# Patient Record
Sex: FEMALE | Race: WHITE | NOT HISPANIC OR LATINO | Employment: UNEMPLOYED | URBAN - METROPOLITAN AREA
[De-identification: names, ages, dates, MRNs, and addresses within clinical notes are randomized per-mention and may not be internally consistent; named-entity substitution may affect disease eponyms.]

---
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
## 2020-06-03 ENCOUNTER — TELEMEDICINE (OUTPATIENT)
Dept: MEDICAL GROUP | Facility: MEDICAL CENTER | Age: 20
End: 2020-06-03
Payer: COMMERCIAL

## 2020-06-03 VITALS — BODY MASS INDEX: 22.2 KG/M2 | HEIGHT: 64 IN | HEART RATE: 88 BPM | WEIGHT: 130 LBS

## 2020-06-03 DIAGNOSIS — F33.1 MAJOR DEPRESSIVE DISORDER, RECURRENT EPISODE, MODERATE (HCC): ICD-10-CM

## 2020-06-03 DIAGNOSIS — F33.41 RECURRENT MAJOR DEPRESSIVE DISORDER, IN PARTIAL REMISSION (HCC): ICD-10-CM

## 2020-06-03 DIAGNOSIS — J45.20 MILD INTERMITTENT ASTHMA WITHOUT COMPLICATION: ICD-10-CM

## 2020-06-03 DIAGNOSIS — Z30.431 ENCOUNTER FOR ROUTINE CHECKING OF INTRAUTERINE CONTRACEPTIVE DEVICE (IUD): ICD-10-CM

## 2020-06-03 PROCEDURE — 99442 PR PHYSICIAN TELEPHONE EVALUATION 11-20 MIN: CPT | Mod: CR | Performed by: FAMILY MEDICINE

## 2020-06-03 RX ORDER — SERTRALINE HYDROCHLORIDE 100 MG/1
100 TABLET, FILM COATED ORAL DAILY
Qty: 90 TAB | Refills: 3 | Status: SHIPPED | OUTPATIENT
Start: 2020-06-03 | End: 2021-06-02 | Stop reason: SDUPTHER

## 2020-06-03 SDOH — HEALTH STABILITY: MENTAL HEALTH: HOW OFTEN DO YOU HAVE A DRINK CONTAINING ALCOHOL?: 2-4 TIMES A MONTH

## 2020-06-03 NOTE — PROGRESS NOTES
Telephone Appointment Visit   As a means of avoiding spread of COVID-19, this visit is being conducted by telephone. This telephone visit was initiated by the patient and they verbally consented.  We had issues with video so converted to telephone appt per patient request   Time at start of call: 1:20    Reason for Call:  Medication Follow-up    HPI:    Refill zoloft      Labs / Images Reviewed:        Assessment and Plan:     1. Recurrent major depressive disorder, in partial remission (HCC)  - sertraline (ZOLOFT) 100 MG Tab; Take 1 Tab by mouth every day.  Dispense: 90 Tab; Refill: 3    2. Major depressive disorder, recurrent episode, moderate (HCC)    3. Encounter for routine checking of intrauterine contraceptive device (IUD)    4. Mild intermittent asthma without complication    Problem List Items Addressed This Visit     Major depressive disorder, recurrent episode, moderate (HCC)     Takes zoloft   Was initially seeing pediatric psychiatrist            Relevant Medications    sertraline (ZOLOFT) 100 MG Tab    Mild intermittent asthma without complication     Overall well controlled   She has been doing allergy shots for 5 years               Encounter for routine checking of intrauterine contraceptive device (IUD)     Just got IUD about 2 mo ago   Off ocp                Other Visit Diagnoses     Recurrent major depressive disorder, in partial remission (HCC)        Relevant Medications    sertraline (ZOLOFT) 100 MG Tab          Follow-up:     Time at end of call: 1:35  Total Time Spent: 11-20 minutes    Yajaira Gardner M.D.

## 2020-10-03 ENCOUNTER — HOSPITAL ENCOUNTER (EMERGENCY)
Facility: MEDICAL CENTER | Age: 20
End: 2020-10-04
Attending: EMERGENCY MEDICINE | Admitting: EMERGENCY MEDICINE
Payer: COMMERCIAL

## 2020-10-03 DIAGNOSIS — S09.90XA CLOSED HEAD INJURY, INITIAL ENCOUNTER: ICD-10-CM

## 2020-10-03 DIAGNOSIS — S01.01XA LACERATION OF SCALP, INITIAL ENCOUNTER: ICD-10-CM

## 2020-10-03 PROCEDURE — 304999 HCHG REPAIR-SIMPLE/INTERMED LEVEL 1

## 2020-10-03 PROCEDURE — 305308 HCHG STAPLER,SKIN,DISP.

## 2020-10-03 PROCEDURE — 304217 HCHG IRRIGATION SYSTEM

## 2020-10-03 PROCEDURE — 99281 EMR DPT VST MAYX REQ PHY/QHP: CPT

## 2020-10-04 VITALS
SYSTOLIC BLOOD PRESSURE: 115 MMHG | HEART RATE: 82 BPM | HEIGHT: 64 IN | RESPIRATION RATE: 16 BRPM | OXYGEN SATURATION: 98 % | DIASTOLIC BLOOD PRESSURE: 68 MMHG | WEIGHT: 130 LBS | BODY MASS INDEX: 22.2 KG/M2 | TEMPERATURE: 97.2 F

## 2020-10-04 NOTE — ED PROVIDER NOTES
"ED Provider Note    CHIEF COMPLAINT  Fall, scalp laceration    HPI  Ava Pearce is a 20 y.o. female who presents to the emergency department for evaluation of fall and scalp laceration.  The patient states that she was walking in her backyard when she excellently slipped and fell onto compacted dirt.  She states that she struck the right posterior side of her scalp.  She noted some bleeding from the area.  She denies any loss of consciousness.  She is not had any vomiting since incident.  She states that she is mildly lightheaded but denies any changes in vision, difficulty walking, difficulty speaking, or focal neuro deficit.  She denies any recent fevers, coughing, wheezing, congestion, runny nose, sore throat, chest pain, shortness of breath, abdominal pain, diarrhea, or urinary symptoms.  She is not better anyone with COVID-19 that she is aware of.    REVIEW OF SYSTEMS  See HPI for further details. All other systems are negative.     PAST MEDICAL HISTORY   has a past medical history of Allergy, Anxiety, ASTHMA, Depression, and Heart burn.    SOCIAL HISTORY  Social History     Tobacco Use   • Smoking status: Never Smoker   • Smokeless tobacco: Never Used   • Tobacco comment: Rarely \"vape\"   Substance and Sexual Activity   • Alcohol use: Yes     Frequency: 2-4 times a month     Comment: weekly   • Drug use: Yes     Types: Marijuana     Comment: daily   • Sexual activity: Yes     Birth control/protection: Pill       SURGICAL HISTORY   has a past surgical history that includes turbinoplasty (Bilateral, 7/26/2017); antrostomy (7/26/2017); and ethmoidectomy (7/26/2017).    CURRENT MEDICATIONS  Home Medications    **Home medications have not yet been reviewed for this encounter**         ALLERGIES  Allergies   Allergen Reactions   • Pcn [Penicillins] Rash and Swelling   • Wellbutrin [Bupropion]        PHYSICAL EXAM  VITAL SIGNS: /75   Pulse 80   Temp 36.2 °C (97.2 °F) (Temporal)   Resp 16   Ht 1.626 " "m (5' 4\")   Wt 59 kg (130 lb)   LMP 09/03/2020   SpO2 98%   BMI 22.31 kg/m²    Constitutional: Alert and in no apparent distress.  HENT: Normocephalic atraumatic.  There is a 1 cm linear parietal scalp laceration on the right.  No associated boggy hematoma or step-off deformities are noted.  No hemotympanum.  Bilateral external ears normal. Nose normal. Mucous membranes are moist.  Eyes: Pupils are equal and reactive. Conjunctiva normal. Non-icteric sclera.   Neck: Normal range of motion without tenderness. Supple. No meningeal signs.  Cardiovascular: Regular rate and rhythm. No murmurs, gallops or rubs.  Thorax & Lungs: Breath sounds are clear to auscultation bilaterally. No wheezing, rhonchi or rales.  Musculoskeletal: Good range of motion in all major joints. No tenderness to palpation or major deformities noted.   Neurologic: Alert and oriented ×3.  No facial asymmetry.  The patient moves all 4 extremities and follows commands.  Normal gait.    DIAGNOSTIC STUDIES / PROCEDURES    Laceration Repair Procedure    Indication: Laceration    Location/Description: 1 cm right parietal scalp    Procedure: The patient was placed in the appropriate position and anesthesia around the laceration was not performed at the patient's request. The area was then irrigated with normal saline. The laceration was closed with staples. There were no additional lacerations requiring repair. The wound area was then dressed with bacitracin.      Total repaired wound length: 1 cm.     Other Items: Staple count: 2    The patient tolerated the procedure well.    Complications: None    COURSE & MEDICAL DECISION MAKING  Pertinent Labs & Imaging studies reviewed. (See chart for details)    This is a 20-year-old female presenting to the emergency department for evaluation of a scalp laceration.  On initial evaluation, the patient.  Well and in no acute distress.  Her vital signs were completely normal.  Her neurologic exam was reassuring and " nonfocal.  She did have some mild tenderness palpation over the right parietal scalp and a laceration of approximately 1 cm in length was noted.  No associated boggy hematoma or step-off deformities were noted.  No hemotympanum or healy sign were noted.  At this time, I am less concern for significant injury including skull fracture or intracranial hemorrhage given the lack of vomiting, lack of loss of consciousness and reassuring neurologic exam.  I do not think that she requires a head CT at this time.  The wound was repaired.  Please see note above.  She is stable for discharge and encouraged her to follow-up in 7 days for staple removal.  She will return to the ED with any worsening signs or symptoms.    Patient with acute low mechanism head injury with completely intact neurovascular exam, and pain improved in ED. CT head was not indicated today, and patient is managed empirically as a mild head injury, given instruction on follow up and signs/symptoms to return to ED. Patient expressed understanding and is agreeable. Patient is improved and discharged home in no distress.    I verified that the patient was wearing a mask and I was wearing appropriate PPE every time I entered the room. The patient's mask was on the patient at all times during my encounter except for a brief view of the oropharynx.    FINAL IMPRESSION  1. Laceration of scalp, initial encounter    2. Closed head injury, initial encounter        PRESCRIPTIONS  New Prescriptions    No medications on file       FOLLOW UP  Yajaira Gardner M.D.  [address] [facility]  [address] [place] [id number]  [phone]    Go in 7 days  For suture removal    [facility], Emergency Dept  [address] [facility] [id number]  [phone]  Go to   As needed        -DISCHARGE-      Electronically signed by: Mel Lea D.O., 10/3/2020 11:22 PM

## 2020-10-04 NOTE — ED TRIAGE NOTES
Walked into triage c/o head lac to posterior part of the head.  Pt states she tripped and fell backwards.  Bleeding controlled at this time, denies LOC or n/v    Pt & staff masked and in appropriate PPE during encounter.  Pt denies fever/travel or being in contact with anyone testing positive for Covid.  Explained pt the triage process, made pt aware to tell the RN/staff of any changes/concerns, pt verbalized understanding of process and instructions given.  Pt to ROLANDO neal.

## 2020-10-09 ENCOUNTER — OFFICE VISIT (OUTPATIENT)
Dept: MEDICAL GROUP | Facility: MEDICAL CENTER | Age: 20
End: 2020-10-09
Payer: COMMERCIAL

## 2020-10-09 VITALS
BODY MASS INDEX: 22.88 KG/M2 | HEART RATE: 61 BPM | OXYGEN SATURATION: 98 % | SYSTOLIC BLOOD PRESSURE: 102 MMHG | DIASTOLIC BLOOD PRESSURE: 64 MMHG | TEMPERATURE: 97.6 F | WEIGHT: 134 LBS | HEIGHT: 64 IN

## 2020-10-09 DIAGNOSIS — S01.01XD LACERATION OF SCALP, SUBSEQUENT ENCOUNTER: ICD-10-CM

## 2020-10-09 PROBLEM — S01.01XA SCALP LACERATION: Status: ACTIVE | Noted: 2020-10-09

## 2020-10-09 PROCEDURE — 99213 OFFICE O/P EST LOW 20 MIN: CPT | Performed by: FAMILY MEDICINE

## 2020-10-09 ASSESSMENT — PATIENT HEALTH QUESTIONNAIRE - PHQ9
SUM OF ALL RESPONSES TO PHQ QUESTIONS 1-9: 0
2. FEELING DOWN, DEPRESSED, IRRITABLE, OR HOPELESS: NOT AT ALL
3. TROUBLE FALLING OR STAYING ASLEEP OR SLEEPING TOO MUCH: NOT AT ALL
1. LITTLE INTEREST OR PLEASURE IN DOING THINGS: NOT AT ALL
9. THOUGHTS THAT YOU WOULD BE BETTER OFF DEAD, OR OF HURTING YOURSELF: NOT AT ALL
SUM OF ALL RESPONSES TO PHQ9 QUESTIONS 1 AND 2: 0
5. POOR APPETITE OR OVEREATING: NOT AT ALL
4. FEELING TIRED OR HAVING LITTLE ENERGY: NOT AT ALL
8. MOVING OR SPEAKING SO SLOWLY THAT OTHER PEOPLE COULD HAVE NOTICED. OR THE OPPOSITE, BEING SO FIGETY OR RESTLESS THAT YOU HAVE BEEN MOVING AROUND A LOT MORE THAN USUAL: NOT AT ALL
7. TROUBLE CONCENTRATING ON THINGS, SUCH AS READING THE NEWSPAPER OR WATCHING TELEVISION: NOT AT ALL
6. FEELING BAD ABOUT YOURSELF - OR THAT YOU ARE A FAILURE OR HAVE LET YOURSELF OR YOUR FAMILY DOWN: NOT AL ALL

## 2020-10-09 NOTE — ASSESSMENT & PLAN NOTE
The patient suffered a scalp laceration on 10/3/20 after falling on compacted dirt. She was seen in the ED following her injury.  Her laceration was irrigated and closed with 2 staples.  Her symptoms did not warrant imaging. The patient denies headache, nausea, vomiting, confusion, or vision changes. Scabbed area with two staples on right parietal area visualized, stable removed without difficulty today.

## 2020-10-09 NOTE — PROGRESS NOTES
"Subjective:     CC: staple removal    HPI:   Ava presents today with:    Scalp laceration  The patient suffered a scalp laceration on 10/3/20 after falling on compacted dirt. She was seen in the ED following her injury.  Her laceration was irrigated and closed with 2 staples.  Her symptoms did not warrant imaging. The patient denies headache, nausea, vomiting, confusion, or vision changes. Scabbed area with two staples on right parietal area visualized, stable removed without difficulty today.      Past Medical History:   Diagnosis Date   • Allergy    • Anxiety    • ASTHMA    • Depression    • Heart burn        Social History     Tobacco Use   • Smoking status: Never Smoker   • Smokeless tobacco: Never Used   • Tobacco comment: Rarely \"vape\"   Substance Use Topics   • Alcohol use: Yes     Frequency: 2-4 times a month     Comment: weekly   • Drug use: Yes     Types: Marijuana     Comment: daily       Current Outpatient Medications Ordered in Epic   Medication Sig Dispense Refill   • sertraline (ZOLOFT) 100 MG Tab Take 1 Tab by mouth every day. 90 Tab 3   • fexofenadine-pseudoephedrine (ALLEGRA-D)  MG per tablet Take 1 Tab by mouth 2 times a day. 30 Tab 0   • Olopatadine HCl 0.6 % Solution      • fluticasone (FLONASE) 50 MCG/ACT nasal spray Spray 1 Spray in nose every day.     • albuterol (VENTOLIN OR PROVENTIL) 108 (90 BASE) MCG/ACT AERS Inhale 2 Puffs by mouth every 6 hours as needed.       • azithromycin (ZITHROMAX) 250 MG Tab Take 2 tabs on day 1 then 1 on day 2-5 (Patient not taking: Reported on 10/9/2020) 6 Tab 0     No current Epic-ordered facility-administered medications on file.        Allergies:  Pcn [penicillins] and Wellbutrin [bupropion]    Health Maintenance: Patient declined vaccinations    ROS:  Gen: no fevers/chills, no changes in weight  Eyes: no changes in vision  ENT: no sore throat, no hearing loss, no bloody nose  Pulm: no SOB  CV: no chest pain      Objective:       Exam:  /64 (BP " "Location: Right arm, Patient Position: Sitting, BP Cuff Size: Adult long)   Pulse 61   Temp 36.4 °C (97.6 °F) (Temporal)   Ht 1.626 m (5' 4\")   Wt 60.8 kg (134 lb)   LMP 10/07/2020 (Approximate)   SpO2 98%   BMI 23.00 kg/m²  Body mass index is 23 kg/m².    Constitutional: Alert, no distress, well-groomed  Skin: Scabbed area with two staples on right parietal area visualized  Eyes: Equal, round and reactive, conjunctiva clear, no ptosis  ENMT: Lips without lesions, good dentition, moist mucous membranes  Neck: Trachea midline, no obvious thyromegaly  Respiratory: Unlabored respiratory effort  Neuro: Grossly non-focal  Psych: Alert and oriented, normal affect and mood        Assessment & Plan:     20 y.o. female with the following -     1. Laceration of scalp, subsequent encounter  Patient suffered 1cm scalp laceration 10/3/20 after GLF, two staples placed in ED. Staples removed without difficulty today. Instructed to wash scalp gently with shampoo as she has been. S/sx of infection and return precautions discussed.    Return if symptoms worsen or fail to improve.    Please note this dictation was created using voice recognition software. I have made every reasonable attempt to correct obvious errors, but I expect there may be errors of grammar, and possibly content, that I did not discover before finalizing the note.       "

## 2021-01-14 ENCOUNTER — OFFICE VISIT (OUTPATIENT)
Dept: MEDICAL GROUP | Facility: MEDICAL CENTER | Age: 21
End: 2021-01-14
Payer: COMMERCIAL

## 2021-01-14 VITALS
HEART RATE: 60 BPM | OXYGEN SATURATION: 96 % | TEMPERATURE: 97.6 F | RESPIRATION RATE: 16 BRPM | HEIGHT: 64 IN | WEIGHT: 138.89 LBS | SYSTOLIC BLOOD PRESSURE: 98 MMHG | BODY MASS INDEX: 23.71 KG/M2 | DIASTOLIC BLOOD PRESSURE: 58 MMHG

## 2021-01-14 DIAGNOSIS — R53.83 OTHER FATIGUE: ICD-10-CM

## 2021-01-14 DIAGNOSIS — Z30.9 ENCOUNTER FOR CONTRACEPTIVE MANAGEMENT, UNSPECIFIED TYPE: ICD-10-CM

## 2021-01-14 DIAGNOSIS — R10.2 PELVIC PAIN IN FEMALE: ICD-10-CM

## 2021-01-14 PROCEDURE — 99214 OFFICE O/P EST MOD 30 MIN: CPT | Mod: 95,CR | Performed by: FAMILY MEDICINE

## 2021-01-14 RX ORDER — NORETHINDRONE ACETATE AND ETHINYL ESTRADIOL .02; 1 MG/1; MG/1
1 TABLET ORAL DAILY
Qty: 84 TAB | Refills: 3 | Status: SHIPPED
Start: 2021-01-14 | End: 2021-02-10

## 2021-01-14 NOTE — ASSESSMENT & PLAN NOTE
Had 2-3 days of crampy intermittent pelvic pain last week which has resolved  She is sexually active   She has stopped her nuvaring   lmp 12/26    I am open to labs per her request for her chronic fatigue but also suggest that we include u preg and sti screen, repeat u preg 2 weeks after last episode of unprotected sexual intercourse     Option to do pelvic US if pelvic pain worsens /returns     We discuss long term contraception options and ultimately decide on ocp rx sent in today risk benefit side effect discussed counseled to use back up method for 2 weeks since she has had gap in her contraception     Virtual follow up with me 2-3 weeks       Over 25 minutes spent with patient face to face, greater than 50% time spent with plan/coordination of care regarding that which is discussed in the HPI and A&P

## 2021-01-14 NOTE — PROGRESS NOTES
"Virtual Visit: Established Patient   This visit was conducted via Zoom using secure and encrypted videoconferencing technology. The patient was in a private location in the state of [place].    The patient's identity was confirmed and verbal consent was obtained for this virtual visit.    Subjective:   CC:   Chief Complaint   Patient presents with   • GI Problem     stomach pain starting starting about 1 weeks ago   • Requesting Labs     interested in blood work for anemia, has had fatigue recently       Ava Pearce is a 20 y.o. female presenting for evaluation and management of:    Lower stomach pelvic pain bloating cramping   No abnormal vaginal discharge   No fever   No n/v   No change in stool   Lasted 2-3 days and occurred \"last week\"  She is feeling well currently     She is here today with her mom     ROS  Denies any recent fevers or chills. No nausea or vomiting. No chest pains or shortness of breath.     Allergies   Allergen Reactions   • Pcn [Penicillins] Rash and Swelling   • Wellbutrin [Bupropion]        Current medicines (including changes today)  Current Outpatient Medications   Medication Sig Dispense Refill   • norethindrone-ethinyl estradiol (EVE 1/20) 1-20 MG-MCG per tablet Take 1 Tab by mouth every day. 84 Tab 3   • sertraline (ZOLOFT) 100 MG Tab Take 1 Tab by mouth every day. 90 Tab 3   • fexofenadine-pseudoephedrine (ALLEGRA-D)  MG per tablet Take 1 Tab by mouth 2 times a day. 30 Tab 0   • Olopatadine HCl 0.6 % Solution      • fluticasone (FLONASE) 50 MCG/ACT nasal spray Spray 1 Spray in nose every day.     • albuterol (VENTOLIN OR PROVENTIL) 108 (90 BASE) MCG/ACT AERS Inhale 2 Puffs by mouth every 6 hours as needed.       • azithromycin (ZITHROMAX) 250 MG Tab Take 2 tabs on day 1 then 1 on day 2-5 (Patient not taking: Reported on 10/9/2020) 6 Tab 0     No current facility-administered medications for this visit.        Patient Active Problem List    Diagnosis Date Noted   • " "Other fatigue 01/14/2021   • Pelvic pain in female 01/14/2021   • Scalp laceration 10/09/2020   • Encounter for routine checking of intrauterine contraceptive device (IUD) 06/03/2020   • Sore throat 11/18/2019   • Sinusitis 11/18/2019   • Epigastric pain 10/30/2019   • Bumps on skin 12/26/2018   • Dysuria 12/26/2018   • Self-mutilation 10/04/2018   • Encounter for counseling regarding contraception 08/20/2018   • Mild intermittent asthma without complication 08/20/2018   • Cannabis abuse, daily use 08/01/2018   • Suicidal ideation 08/01/2018   • Seasonal allergic rhinitis due to pollen 07/26/2017   • Recreational drug use, episodic 07/19/2017   • Attention deficit hyperactivity disorder, inattentive type 01/24/2017   • Major depressive disorder, recurrent episode, moderate (HCC) 10/27/2016       Family History   Problem Relation Age of Onset   • Depression Mother    • Depression Brother    • Drug abuse Brother    • Bipolar disorder Paternal Aunt    • Alcohol abuse Maternal Grandfather    • Depression Maternal Grandmother    • Depression Cousin        She  has a past medical history of Allergy, Anxiety, ASTHMA, Depression, and Heart burn.  She  has a past surgical history that includes turbinoplasty (Bilateral, 7/26/2017); antrostomy (7/26/2017); and ethmoidectomy (7/26/2017).       Objective:   BP (!) 98/58 (BP Location: Left arm, Patient Position: Sitting, BP Cuff Size: Adult)   Pulse 60   Temp 36.4 °C (97.6 °F) (Temporal)   Resp 16   Ht 1.626 m (5' 4\")   Wt 63 kg (138 lb 14.2 oz)   LMP 12/24/2020   SpO2 96%   BMI 23.84 kg/m²     Physical Exam  Constitutional: Alert, no distress, well-groomed.  Skin: No rashes in visible areas.  Eye: Round. Conjunctiva clear, lids normal. No icterus.   ENMT: Lips pink without lesions, good dentition, moist mucous membranes. Phonation normal.  Neck: No masses, no thyromegaly. Moves freely without pain.  Respiratory: Unlabored respiratory effort, no cough or audible " wheeze  Psych: Alert and oriented x3, normal affect and mood.       Assessment and Plan:   The following treatment plan was discussed:     1. Other fatigue    2. Pelvic pain in female  - HEMOGLOBIN AND HEMATOCRIT; Future  - TSH WITH REFLEX TO FT4; Future  - IRON/TOTAL IRON BIND; Future  - Comp Metabolic Panel; Future  - HCG QUALITATIVE UR; Future  - Chlamydia/GC PCR Urine Or Swab; Future  - US-PELVIC COMPLETE (TRANSABDOMINAL/TRANSVAGINAL) (COMBO); Future    3. Encounter for contraceptive management, unspecified type  - norethindrone-ethinyl estradiol (EVE 1/20) 1-20 MG-MCG per tablet; Take 1 Tab by mouth every day.  Dispense: 84 Tab; Refill: 3    Problem List Items Addressed This Visit     Other fatigue    Pelvic pain in female     Had 2-3 days of crampy intermittent pelvic pain last week which has resolved  She is sexually active   She has stopped her nuvaring   lmp 12/26    I am open to labs per her request for her chronic fatigue but also suggest that we include u preg and sti screen, repeat u preg 2 weeks after last episode of unprotected sexual intercourse     Option to do pelvic US if pelvic pain worsens /returns     We discuss long term contraception options and ultimately decide on ocp rx sent in today risk benefit side effect discussed counseled to use back up method for 2 weeks since she has had gap in her contraception     Virtual follow up with me 2-3 weeks       Over 25 minutes spent with patient face to face, greater than 50% time spent with plan/coordination of care regarding that which is discussed in the HPI and A&P             Relevant Orders    HEMOGLOBIN AND HEMATOCRIT    TSH WITH REFLEX TO FT4    IRON/TOTAL IRON BIND    Comp Metabolic Panel    HCG QUALITATIVE UR    Chlamydia/GC PCR Urine Or Swab    US-PELVIC COMPLETE (TRANSABDOMINAL/TRANSVAGINAL) (COMBO)      Other Visit Diagnoses     Encounter for contraceptive management, unspecified type        Relevant Medications    norethindrone-ethinyl  estradiol (EVE 1/20) 1-20 MG-MCG per tablet            Follow-up: No follow-ups on file.

## 2021-01-26 ENCOUNTER — HOSPITAL ENCOUNTER (OUTPATIENT)
Dept: LAB | Facility: MEDICAL CENTER | Age: 21
End: 2021-01-26
Attending: FAMILY MEDICINE
Payer: COMMERCIAL

## 2021-01-26 DIAGNOSIS — R10.2 PELVIC PAIN IN FEMALE: ICD-10-CM

## 2021-01-26 LAB
ALBUMIN SERPL BCP-MCNC: 4.5 G/DL (ref 3.2–4.9)
ALBUMIN/GLOB SERPL: 1.6 G/DL
ALP SERPL-CCNC: 79 U/L (ref 30–99)
ALT SERPL-CCNC: 16 U/L (ref 2–50)
ANION GAP SERPL CALC-SCNC: 10 MMOL/L (ref 7–16)
AST SERPL-CCNC: 13 U/L (ref 12–45)
BILIRUB SERPL-MCNC: 0.2 MG/DL (ref 0.1–1.5)
BUN SERPL-MCNC: 11 MG/DL (ref 8–22)
CALCIUM SERPL-MCNC: 9.7 MG/DL (ref 8.5–10.5)
CHLORIDE SERPL-SCNC: 102 MMOL/L (ref 96–112)
CO2 SERPL-SCNC: 23 MMOL/L (ref 20–33)
CREAT SERPL-MCNC: 0.65 MG/DL (ref 0.5–1.4)
GLOBULIN SER CALC-MCNC: 2.8 G/DL (ref 1.9–3.5)
GLUCOSE SERPL-MCNC: 75 MG/DL (ref 65–99)
HCT VFR BLD AUTO: 41.8 % (ref 37–47)
HGB BLD-MCNC: 13.4 G/DL (ref 12–16)
IRON SATN MFR SERPL: 17 % (ref 15–55)
IRON SERPL-MCNC: 69 UG/DL (ref 40–170)
POTASSIUM SERPL-SCNC: 3.9 MMOL/L (ref 3.6–5.5)
PROT SERPL-MCNC: 7.3 G/DL (ref 6–8.2)
SODIUM SERPL-SCNC: 135 MMOL/L (ref 135–145)
TIBC SERPL-MCNC: 400 UG/DL (ref 250–450)
TSH SERPL DL<=0.005 MIU/L-ACNC: 1.01 UIU/ML (ref 0.38–5.33)
UIBC SERPL-MCNC: 331 UG/DL (ref 110–370)

## 2021-01-26 PROCEDURE — 84443 ASSAY THYROID STIM HORMONE: CPT

## 2021-01-26 PROCEDURE — 81025 URINE PREGNANCY TEST: CPT

## 2021-01-26 PROCEDURE — 85014 HEMATOCRIT: CPT

## 2021-01-26 PROCEDURE — 83550 IRON BINDING TEST: CPT

## 2021-01-26 PROCEDURE — 80053 COMPREHEN METABOLIC PANEL: CPT

## 2021-01-26 PROCEDURE — 83540 ASSAY OF IRON: CPT

## 2021-01-26 PROCEDURE — 87491 CHLMYD TRACH DNA AMP PROBE: CPT

## 2021-01-26 PROCEDURE — 85018 HEMOGLOBIN: CPT

## 2021-01-26 PROCEDURE — 87591 N.GONORRHOEAE DNA AMP PROB: CPT

## 2021-01-26 PROCEDURE — 36415 COLL VENOUS BLD VENIPUNCTURE: CPT

## 2021-01-27 LAB
C TRACH DNA SPEC QL NAA+PROBE: NEGATIVE
HCG UR QL: NEGATIVE
N GONORRHOEA DNA SPEC QL NAA+PROBE: NEGATIVE
SPECIMEN SOURCE: NORMAL

## 2021-02-10 ENCOUNTER — OFFICE VISIT (OUTPATIENT)
Dept: MEDICAL GROUP | Facility: MEDICAL CENTER | Age: 21
End: 2021-02-10
Payer: COMMERCIAL

## 2021-02-10 VITALS
WEIGHT: 144.84 LBS | TEMPERATURE: 98.5 F | HEART RATE: 68 BPM | SYSTOLIC BLOOD PRESSURE: 122 MMHG | BODY MASS INDEX: 24.73 KG/M2 | OXYGEN SATURATION: 95 % | DIASTOLIC BLOOD PRESSURE: 80 MMHG | HEIGHT: 64 IN

## 2021-02-10 DIAGNOSIS — M26.629 TMJ PAIN DYSFUNCTION SYNDROME: ICD-10-CM

## 2021-02-10 DIAGNOSIS — R42 LIGHTHEADEDNESS: ICD-10-CM

## 2021-02-10 DIAGNOSIS — G43.019 INTRACTABLE MIGRAINE WITHOUT AURA AND WITHOUT STATUS MIGRAINOSUS: ICD-10-CM

## 2021-02-10 PROBLEM — S03.00XA TMJ (DISLOCATION OF TEMPOROMANDIBULAR JOINT): Status: ACTIVE | Noted: 2021-02-10

## 2021-02-10 PROCEDURE — 99214 OFFICE O/P EST MOD 30 MIN: CPT | Performed by: FAMILY MEDICINE

## 2021-02-10 RX ORDER — SUMATRIPTAN 25 MG/1
25-100 TABLET, FILM COATED ORAL
Qty: 10 TABLET | Refills: 3 | Status: SHIPPED | OUTPATIENT
Start: 2021-02-10

## 2021-02-10 NOTE — ASSESSMENT & PLAN NOTE
The patient reports history of migraines which have steadily been increasing in frequency over the past 5 months. She suffered ground-level fall in October 2020 during which she hit her head and reports increasing right TMJ pain and migraines since that time. She awakes at least four mornings with severe pain and tightness of right TMJ area and masseter.  She reports this leads to a migraine.  She has a history of TMJ disorder and has followed with [facility]. She wears a mouth guard at night.

## 2021-02-10 NOTE — PROGRESS NOTES
"Subjective:     CC: lightheadedness, migraines, TMJ pain    HPI:   Ava is established with Dr. Gardner and presents to discuss the following issues:    Lightheadedness  The patient reports intermittent lightheadedness when standing from a seated position. She reports this started about two months ago. She reports occasional associated nausea. No chest pain, palpitations, or shortness of breath.  The patient admits that she could increase her hydration.    Intractable migraine without aura and without status migrainosus  The patient reports history of migraines which have steadily been increasing in frequency over the past 5 months. She suffered ground-level fall in October 2020 during which she hit her head and reports increasing right TMJ pain and migraines since that time. She is concerned she may have had a brain injury; she is reassured s/p normal neuro exam. She awakes at least four mornings with severe pain and tightness of right TMJ area and masseter.  She reports this leads to a migraine.  She has a history of TMJ disorder and has followed with Dr[facility] Anish Rea [facility]. She wears a mouth guard at night.    The patient reports her migraines improve with resting in a dark room.    Past Medical History:   Diagnosis Date   • Allergy    • Anxiety    • ASTHMA    • Depression    • Heart burn        Social History     Tobacco Use   • Smoking status: Never Smoker   • Smokeless tobacco: Never Used   • Tobacco comment: Rarely \"vape\"   Substance Use Topics   • Alcohol use: Yes     Comment: weekly   • Drug use: Yes     Types: Marijuana     Comment: daily       Current Outpatient Medications Ordered in Epic   Medication Sig Dispense Refill   • SUMAtriptan (IMITREX) 25 MG Tab tablet Take 1-4 Tablets by mouth one time as needed for Migraine for up to 1 dose. 10 tablet 3   • sertraline (ZOLOFT) 100 MG Tab Take 1 Tab by mouth every day. 90 Tab 3   • Olopatadine HCl 0.6 % Solution      • fluticasone " "(FLONASE) 50 MCG/ACT nasal spray Spray 1 Spray in nose every day.     • albuterol (VENTOLIN OR PROVENTIL) 108 (90 BASE) MCG/ACT AERS Inhale 2 Puffs by mouth every 6 hours as needed.       • fexofenadine-pseudoephedrine (ALLEGRA-D)  MG per tablet Take 1 Tab by mouth 2 times a day. 30 Tab 0     No current Epic-ordered facility-administered medications on file.       Allergies:  Pcn [penicillins] and Wellbutrin [bupropion]    Health Maintenance:     ROS:  Gen: no fevers/chills, no changes in weight  Eyes: no changes in vision  ENT: no sore throat, no hearing loss, no bloody nose  Pulm: no SOB  CV: no chest pain        Objective:       Exam:  /80 (BP Location: Left arm, Patient Position: Sitting, BP Cuff Size: Adult)   Pulse 68   Temp 36.9 °C (98.5 °F) (Temporal)   Ht 1.626 m (5' 4\")   Wt 65.7 kg (144 lb 13.5 oz)   LMP 01/26/2021   SpO2 95%   BMI 24.86 kg/m²  Body mass index is 24.86 kg/m².    Gen: Alert and oriented, No apparent distress  Lungs: Normal effort, CTA bilaterally, no wheezes, rhonchi, or rales  CV: Regular rate and rhythm, no murmurs, rubs, or gallops  Neuro: CN II-XII intact, strength 5/5 and symmetric in biceps, triceps, quadricepts, hamstrings; 5/5  strength bilaterally; sensation intact bilaterally to light touch and pinprick, patellar reflexes 2+ bilaterally, no pronator drift, no dysdiadochokinesia,       Assessment & Plan:     20 y.o. female with the following -     1. Lightheadedness  I suspect this is secondary to dehydration vs vasovagal.  I do not feel we need to proceed with work-up for cardiac etiology at this time.   - Discussed increasing hydration at length with patient; RTC if symptoms persist    2. Intractable migraine without aura and without status migrainosus  3. TMJ pain dysfunction syndrome  The patient reports increasing migraine frequency which appears to be directly related to her TMJ pain. Patient will follow up with Dr. Anish Rea, TMJ specialist who she " has seen previously. In the meantime we discussed using sumatriptan for abortive therapy no more than twice weekly.    Other orders  - SUMAtriptan (IMITREX) 25 MG Tab tablet; Take 1-4 Tablets by mouth one time as needed for Migraine for up to 1 dose.  Dispense: 10 tablet; Refill: 3      Return if symptoms worsen or fail to improve.    Please note this dictation was created using voice recognition software. I have made every reasonable attempt to correct obvious errors, but I expect there may be errors of grammar, and possibly content, that I did not discover before finalizing the note.

## 2021-02-10 NOTE — ASSESSMENT & PLAN NOTE
The patient reports intermittent lightheadedness when standing from a seated position. She reports this started about two months ago. She reports occasional associated nausea. No chest pain, palpitations, or shortness of breath.  The patient admits that she could increase her hydration.

## 2021-03-12 ENCOUNTER — APPOINTMENT (RX ONLY)
Dept: URBAN - METROPOLITAN AREA CLINIC 4 | Facility: CLINIC | Age: 21
Setting detail: DERMATOLOGY
End: 2021-03-12

## 2021-03-12 DIAGNOSIS — L81.4 OTHER MELANIN HYPERPIGMENTATION: ICD-10-CM

## 2021-03-12 DIAGNOSIS — D22 MELANOCYTIC NEVI: ICD-10-CM

## 2021-03-12 PROBLEM — D22.39 MELANOCYTIC NEVI OF OTHER PARTS OF FACE: Status: ACTIVE | Noted: 2021-03-12

## 2021-03-12 PROCEDURE — ? ADDITIONAL NOTES

## 2021-03-12 PROCEDURE — ? COUNSELING

## 2021-03-12 PROCEDURE — 99212 OFFICE O/P EST SF 10 MIN: CPT

## 2021-03-12 ASSESSMENT — LOCATION ZONE DERM
LOCATION ZONE: NOSE
LOCATION ZONE: FACE

## 2021-03-12 ASSESSMENT — LOCATION SIMPLE DESCRIPTION DERM
LOCATION SIMPLE: GLABELLA
LOCATION SIMPLE: LEFT NOSE

## 2021-03-12 ASSESSMENT — LOCATION DETAILED DESCRIPTION DERM
LOCATION DETAILED: GLABELLA
LOCATION DETAILED: LEFT NASAL SIDEWALL

## 2021-03-12 NOTE — PROCEDURE: ADDITIONAL NOTES
Render Risk Assessment In Note?: no
Detail Level: Simple
Additional Notes: Recommend patient contact our cosmetic department for possible laser treatments.
Additional Notes: Recommend patient see plastic surgeon for removal.

## 2021-03-26 ENCOUNTER — APPOINTMENT (RX ONLY)
Dept: URBAN - METROPOLITAN AREA CLINIC 20 | Facility: CLINIC | Age: 21
Setting detail: DERMATOLOGY
End: 2021-03-26

## 2021-03-26 DIAGNOSIS — Z41.9 ENCOUNTER FOR PROCEDURE FOR PURPOSES OTHER THAN REMEDYING HEALTH STATE, UNSPECIFIED: ICD-10-CM

## 2021-03-26 PROCEDURE — ? COSMETIC CONSULTATION: BBL

## 2021-03-26 PROCEDURE — ? SCITON BBL

## 2021-03-26 ASSESSMENT — LOCATION SIMPLE DESCRIPTION DERM: LOCATION SIMPLE: LEFT CHEEK

## 2021-03-26 ASSESSMENT — LOCATION ZONE DERM: LOCATION ZONE: FACE

## 2021-03-26 ASSESSMENT — LOCATION DETAILED DESCRIPTION DERM: LOCATION DETAILED: LEFT SUPERIOR NASAL CHEEK

## 2021-03-26 NOTE — PROCEDURE: SCITON BBL
Passes: 1
Pulse Duration Units: milliseconds
Post-Care Instructions: I reviewed with the patient in detail post-care instructions. Patient should stay away from the sun and wear sun protection until treated areas are fully healed.
Pulse Duration: 20
Preprocedure Text: The treatment areas were thoroughly cleaned. Any exposed at risk hair that was not to be treated was covered in white paper tape. Clear ultrasound gel was applied to the treatment area. The area was treated with no immediate stacking of pulses.
Anesthesia Volume In Cc: 0
Spot Size: Finesse Adapter Size: 15 x 15 mm square
Fluence (J/Cm2): 25
Hide Repetition Rate?: No
Cooling ?: Yes
Post Procedure Text: The patient tolerated the procedure well. Ice-chilled washclothes were applied to the treatment area for comfort. Post care was reviewed with the patient.
Cooling (In C): 15
Repetition Rate (Hz): 10
Detail Level: Zone
Spot Size: Finesse Adapter Size: 7 mm round
Price (Use Numbers Only, No Special Characters Or $): 75
Consent: Written consent obtained, risks reviewed including but not limited to crusting, scabbing, blistering, scarring, darker or lighter pigmentary change, bruising, and/or incomplete response.

## 2021-05-18 ENCOUNTER — HOSPITAL ENCOUNTER (OUTPATIENT)
Facility: MEDICAL CENTER | Age: 21
End: 2021-05-18
Attending: NURSE PRACTITIONER
Payer: COMMERCIAL

## 2021-05-18 ENCOUNTER — OFFICE VISIT (OUTPATIENT)
Dept: URGENT CARE | Facility: PHYSICIAN GROUP | Age: 21
End: 2021-05-18
Payer: COMMERCIAL

## 2021-05-18 ENCOUNTER — HOSPITAL ENCOUNTER (OUTPATIENT)
Dept: RADIOLOGY | Facility: MEDICAL CENTER | Age: 21
End: 2021-05-18
Attending: NURSE PRACTITIONER
Payer: COMMERCIAL

## 2021-05-18 VITALS
DIASTOLIC BLOOD PRESSURE: 70 MMHG | HEIGHT: 64 IN | RESPIRATION RATE: 15 BRPM | HEART RATE: 64 BPM | TEMPERATURE: 97.4 F | OXYGEN SATURATION: 97 % | BODY MASS INDEX: 23.05 KG/M2 | SYSTOLIC BLOOD PRESSURE: 124 MMHG | WEIGHT: 135 LBS

## 2021-05-18 DIAGNOSIS — R10.30 LOWER ABDOMINAL PAIN: ICD-10-CM

## 2021-05-18 DIAGNOSIS — R10.2 PELVIC PAIN: ICD-10-CM

## 2021-05-18 LAB
APPEARANCE UR: CLEAR
BILIRUB UR STRIP-MCNC: NORMAL MG/DL
CANDIDA DNA VAG QL PROBE+SIG AMP: POSITIVE
COLOR UR AUTO: NORMAL
G VAGINALIS DNA VAG QL PROBE+SIG AMP: POSITIVE
GLUCOSE UR STRIP.AUTO-MCNC: NORMAL MG/DL
INT CON NEG: NEGATIVE
INT CON POS: POSITIVE
KETONES UR STRIP.AUTO-MCNC: NORMAL MG/DL
LEUKOCYTE ESTERASE UR QL STRIP.AUTO: NORMAL
NITRITE UR QL STRIP.AUTO: NORMAL
PH UR STRIP.AUTO: 7 [PH] (ref 5–8)
POC URINE PREGNANCY TEST: NORMAL
PROT UR QL STRIP: NORMAL MG/DL
RBC UR QL AUTO: NORMAL
SP GR UR STRIP.AUTO: 1.02
T VAGINALIS DNA VAG QL PROBE+SIG AMP: NEGATIVE
UROBILINOGEN UR STRIP-MCNC: NORMAL MG/DL

## 2021-05-18 PROCEDURE — 87510 GARDNER VAG DNA DIR PROBE: CPT

## 2021-05-18 PROCEDURE — 87491 CHLMYD TRACH DNA AMP PROBE: CPT

## 2021-05-18 PROCEDURE — 81025 URINE PREGNANCY TEST: CPT | Performed by: NURSE PRACTITIONER

## 2021-05-18 PROCEDURE — 87480 CANDIDA DNA DIR PROBE: CPT

## 2021-05-18 PROCEDURE — 87086 URINE CULTURE/COLONY COUNT: CPT

## 2021-05-18 PROCEDURE — 87660 TRICHOMONAS VAGIN DIR PROBE: CPT

## 2021-05-18 PROCEDURE — 87591 N.GONORRHOEAE DNA AMP PROB: CPT

## 2021-05-18 PROCEDURE — 81002 URINALYSIS NONAUTO W/O SCOPE: CPT | Performed by: NURSE PRACTITIONER

## 2021-05-18 PROCEDURE — 76830 TRANSVAGINAL US NON-OB: CPT

## 2021-05-18 PROCEDURE — 99214 OFFICE O/P EST MOD 30 MIN: CPT | Performed by: NURSE PRACTITIONER

## 2021-05-18 ASSESSMENT — ENCOUNTER SYMPTOMS
FEVER: 0
CHILLS: 0
ABDOMINAL PAIN: 1

## 2021-05-18 ASSESSMENT — PAIN SCALES - GENERAL: PAINLEVEL: 5=MODERATE PAIN

## 2021-05-19 ENCOUNTER — TELEPHONE (OUTPATIENT)
Dept: URGENT CARE | Facility: PHYSICIAN GROUP | Age: 21
End: 2021-05-19

## 2021-05-19 DIAGNOSIS — R10.2 PELVIC PAIN: ICD-10-CM

## 2021-05-19 DIAGNOSIS — B96.89 BV (BACTERIAL VAGINOSIS): ICD-10-CM

## 2021-05-19 DIAGNOSIS — N76.0 BV (BACTERIAL VAGINOSIS): ICD-10-CM

## 2021-05-19 DIAGNOSIS — B37.31 VAGINAL CANDIDIASIS: ICD-10-CM

## 2021-05-19 RX ORDER — FLUCONAZOLE 150 MG/1
TABLET ORAL
Qty: 4 TABLET | Refills: 0 | Status: SHIPPED
Start: 2021-05-19 | End: 2021-09-19

## 2021-05-19 RX ORDER — METRONIDAZOLE 7.5 MG/G
1 GEL VAGINAL
Qty: 5 EACH | Refills: 0 | Status: SHIPPED | OUTPATIENT
Start: 2021-05-19 | End: 2021-05-24

## 2021-05-20 LAB
BACTERIA UR CULT: NORMAL
SIGNIFICANT IND 70042: NORMAL
SITE SITE: NORMAL
SOURCE SOURCE: NORMAL

## 2021-05-20 NOTE — TELEPHONE ENCOUNTER
Patient notified by phone of results of ultrasound, and results of vaginal pathogens.  Gonorrhea chlamydia and final urine cultures are pending.  At this time, I will refer patient to gynecology for second opinion, and will treat for BV and for yeast infection.  Patient advised I will call her when I receive final urine culture and gonorrhea and Chlamydia results.  Patient verbalized understanding and agreement with plan of care.

## 2021-05-25 RX ORDER — NORETHINDRONE ACETATE AND ETHINYL ESTRADIOL 1MG-20(21)
1 KIT ORAL DAILY
Qty: 84 TABLET | Refills: 3 | Status: SHIPPED
Start: 2021-05-25 | End: 2021-12-30

## 2021-05-27 ENCOUNTER — PATIENT OUTREACH (OUTPATIENT)
Dept: SCHEDULING | Facility: IMAGING CENTER | Age: 21
End: 2021-05-27

## 2021-06-02 ENCOUNTER — OFFICE VISIT (OUTPATIENT)
Dept: MEDICAL GROUP | Facility: MEDICAL CENTER | Age: 21
End: 2021-06-02
Payer: COMMERCIAL

## 2021-06-02 VITALS
RESPIRATION RATE: 16 BRPM | TEMPERATURE: 97 F | BODY MASS INDEX: 23.71 KG/M2 | SYSTOLIC BLOOD PRESSURE: 108 MMHG | WEIGHT: 138.89 LBS | DIASTOLIC BLOOD PRESSURE: 58 MMHG | HEART RATE: 70 BPM | HEIGHT: 64 IN | OXYGEN SATURATION: 94 %

## 2021-06-02 DIAGNOSIS — Z71.84 TRAVEL ADVICE ENCOUNTER: ICD-10-CM

## 2021-06-02 DIAGNOSIS — R53.83 OTHER FATIGUE: ICD-10-CM

## 2021-06-02 DIAGNOSIS — F33.41 RECURRENT MAJOR DEPRESSIVE DISORDER, IN PARTIAL REMISSION (HCC): ICD-10-CM

## 2021-06-02 DIAGNOSIS — R10.2 PELVIC PAIN IN FEMALE: ICD-10-CM

## 2021-06-02 DIAGNOSIS — N83.209 RUPTURED OVARIAN CYST: ICD-10-CM

## 2021-06-02 DIAGNOSIS — F33.1 MAJOR DEPRESSIVE DISORDER, RECURRENT EPISODE, MODERATE (HCC): ICD-10-CM

## 2021-06-02 PROBLEM — S01.01XA SCALP LACERATION: Status: RESOLVED | Noted: 2020-10-09 | Resolved: 2021-06-02

## 2021-06-02 PROBLEM — R45.851 SUICIDAL IDEATION: Status: RESOLVED | Noted: 2018-08-01 | Resolved: 2021-06-02

## 2021-06-02 PROBLEM — J30.1 SEASONAL ALLERGIC RHINITIS DUE TO POLLEN: Status: RESOLVED | Noted: 2017-07-26 | Resolved: 2021-06-02

## 2021-06-02 PROBLEM — F19.90 RECREATIONAL DRUG USE, EPISODIC: Status: RESOLVED | Noted: 2017-07-19 | Resolved: 2021-06-02

## 2021-06-02 PROBLEM — J32.9 SINUSITIS: Status: RESOLVED | Noted: 2019-11-18 | Resolved: 2021-06-02

## 2021-06-02 PROBLEM — Z72.89 SELF-MUTILATION: Status: RESOLVED | Noted: 2018-10-04 | Resolved: 2021-06-02

## 2021-06-02 PROBLEM — J02.9 SORE THROAT: Status: RESOLVED | Noted: 2019-11-18 | Resolved: 2021-06-02

## 2021-06-02 PROCEDURE — 99214 OFFICE O/P EST MOD 30 MIN: CPT | Mod: CS | Performed by: FAMILY MEDICINE

## 2021-06-02 RX ORDER — SERTRALINE HYDROCHLORIDE 100 MG/1
200 TABLET, FILM COATED ORAL DAILY
Qty: 180 TABLET | Refills: 3 | Status: SHIPPED | OUTPATIENT
Start: 2021-06-02

## 2021-06-02 NOTE — ASSESSMENT & PLAN NOTE
Evaluated in gynecology and UC   Feeling better currently   Studies and ultrasound reviewed   STI screens negative

## 2021-06-02 NOTE — PROGRESS NOTES
This medical record contains text that has been entered with the assistance of computer voice recognition and dictation software.  Therefore, it may contain unintended errors in text, spelling, punctuation, or grammar        Chief Complaint   Patient presents with   • Medication Problem     zoloft increase   • Requesting Labs     covid test for travel, interested in blood panel for something involving infection       Ava Pearce is a 20 y.o. female here evaluation and management of:    Interested in increase zoloft for better depression management as well as labs to exclude organic cause of feeling down   Recently evaluated in  for pelvic pain with US   Upcoming trip to [place] needs covid test       Current Outpatient Medications   Medication Sig Dispense Refill   • sertraline (ZOLOFT) 100 MG Tab Take 2 Tablets by mouth every day. 180 tablet 3   • norethindrone-ethinyl estradiol (EVE FE 1/20) 1-20 MG-MCG per tablet Take 1 tablet by mouth every day. 84 tablet 3   • fluconazole (DIFLUCAN) 150 MG tablet Take 1 tablet by mouth weekly until symptoms resolve 4 tablet 0   • SUMAtriptan (IMITREX) 25 MG Tab tablet Take 1-4 Tablets by mouth one time as needed for Migraine for up to 1 dose. 10 tablet 3   • fexofenadine-pseudoephedrine (ALLEGRA-D)  MG per tablet Take 1 Tab by mouth 2 times a day. 30 Tab 0   • Olopatadine HCl 0.6 % Solution      • fluticasone (FLONASE) 50 MCG/ACT nasal spray Spray 1 Spray in nose every day.     • albuterol (VENTOLIN OR PROVENTIL) 108 (90 BASE) MCG/ACT AERS Inhale 2 Puffs by mouth every 6 hours as needed.         No current facility-administered medications for this visit.     Patient Active Problem List    Diagnosis Date Noted   • Lightheadedness 02/10/2021   • Intractable migraine without aura and without status migrainosus 02/10/2021   • TMJ pain dysfunction syndrome 02/10/2021   • Other fatigue 01/14/2021   • Pelvic pain in female 01/14/2021   • Encounter for routine  "checking of intrauterine contraceptive device (IUD) 06/03/2020   • Epigastric pain 10/30/2019   • Bumps on skin 12/26/2018   • Dysuria 12/26/2018   • Encounter for counseling regarding contraception 08/20/2018   • Mild intermittent asthma without complication 08/20/2018   • Cannabis abuse, daily use 08/01/2018   • Attention deficit hyperactivity disorder, inattentive type 01/24/2017   • Major depressive disorder, recurrent episode, moderate (HCC) 10/27/2016     Past Surgical History:   Procedure Laterality Date   • TURBINOPLASTY Bilateral 7/26/2017    Procedure: TURBINOPLASTY;  Surgeon: Isis Camacho M.D.;  Location: SURGERY SAME DAY [facility];  Service:    • ANTROSTOMY  7/26/2017    Procedure: ANTROSTOMY ENDOSCOPIC MAXILLARY ;  Surgeon: Isis Camacho M.D.;  Location: SURGERY SAME DAY [facility];  Service:    • ETHMOIDECTOMY  7/26/2017    Procedure: ETHMOIDECTOMY ENDOSCOPIC TOTAL  ;  Surgeon: Isis Camacho M.D.;  Location: SURGERY SAME DAY [facility];  Service:       Social History     Tobacco Use   • Smoking status: Never Smoker   • Smokeless tobacco: Never Used   • Tobacco comment: Rarely \"vape\"   Vaping Use   • Vaping Use: Former   • Substances: Nicotine   Substance Use Topics   • Alcohol use: Yes     Comment: weekly   • Drug use: Yes     Types: Marijuana     Comment: daily     Family History   Problem Relation Age of Onset   • Depression Mother    • Depression Brother    • Drug abuse Brother    • Bipolar disorder Paternal Aunt    • Alcohol abuse Maternal Grandfather    • Depression Maternal Grandmother    • Depression Cousin            ROS    all review of system completed and negative except for those listed above     Objective:     /58 (BP Location: Left arm, Patient Position: Sitting, BP Cuff Size: Adult)   Pulse 70   Temp 36.1 °C (97 °F) (Temporal)   Resp 16   Ht 1.626 m (5' 4\")   Wt 63 kg (138 lb 14.2 oz)   SpO2 94%  Body mass index is 23.84 kg/m².  Physical " Exam:        GEN: comfortable, alert and oriented, well nourished, well developed, in no apparent distress   HEENT: NCAT, eyes: pupils equal and reactive, sclera white, EOMIT, good dentition  HEART: limbs warm and well perfused, regular rate, no JVD, no lower extremity edema  LUNGS: speaking in full sentences, not in apparent respiratory distress, no audible wheezes  MSK: normal tone and bulk, no swelling of the joints, gait steady and normal         Assessment and Plan:   The following treatment plan was discussed        Problem List Items Addressed This Visit     Major depressive disorder, recurrent episode, moderate (HCC)     Plan to increase   She is currently taking 150mg we will increase to 200 mg              Relevant Medications    sertraline (ZOLOFT) 100 MG Tab    Other fatigue     Requesting labs to exclude organic cause   See orders              Relevant Orders    Basic Metabolic Panel    HEMOGLOBIN AND HEMATOCRIT    IRON/TOTAL IRON BIND    VITAMIN B12    FOLATE    HEMOGLOBIN A1C    TSH    MARITZA REFLEXIVE PROFILE    Pelvic pain in female     Evaluated in gynecology and UC   Feeling better currently   Studies and ultrasound reviewed   STI screens negative              RESOLVED: Ruptured ovarian cyst      Other Visit Diagnoses     Travel advice encounter        Relevant Orders    SARS-CoV-2 PCR (24 hour In-House): Collect NP swab in VTM    Recurrent major depressive disorder, in partial remission (HCC)        Relevant Medications    sertraline (ZOLOFT) 100 MG Tab                Instructed to follow up if symptoms worsen or fail to improve, ER/UC precautions discussed as well    Yajaira Gardner MD  [facility], Family [address]   [place] [address]  Phone: [phone]

## 2021-07-02 ENCOUNTER — OFFICE VISIT (OUTPATIENT)
Dept: URGENT CARE | Facility: CLINIC | Age: 21
End: 2021-07-02
Payer: COMMERCIAL

## 2021-07-02 VITALS
DIASTOLIC BLOOD PRESSURE: 60 MMHG | TEMPERATURE: 97.5 F | BODY MASS INDEX: 23.49 KG/M2 | OXYGEN SATURATION: 95 % | HEART RATE: 67 BPM | SYSTOLIC BLOOD PRESSURE: 100 MMHG | RESPIRATION RATE: 16 BRPM | WEIGHT: 137.6 LBS | HEIGHT: 64 IN

## 2021-07-02 DIAGNOSIS — J02.9 SORE THROAT: ICD-10-CM

## 2021-07-02 DIAGNOSIS — J02.0 STREP THROAT: ICD-10-CM

## 2021-07-02 LAB
INT CON NEG: NEGATIVE
INT CON POS: POSITIVE
S PYO AG THROAT QL: POSITIVE

## 2021-07-02 PROCEDURE — 87880 STREP A ASSAY W/OPTIC: CPT | Performed by: NURSE PRACTITIONER

## 2021-07-02 PROCEDURE — 99214 OFFICE O/P EST MOD 30 MIN: CPT | Performed by: NURSE PRACTITIONER

## 2021-07-02 RX ORDER — AZITHROMYCIN 250 MG/1
TABLET, FILM COATED ORAL
Qty: 6 TABLET | Refills: 0 | Status: SHIPPED
Start: 2021-07-02 | End: 2021-09-19

## 2021-07-02 RX ORDER — DILTIAZEM HYDROCHLORIDE 60 MG/1
TABLET, FILM COATED ORAL
COMMUNITY
Start: 2021-04-09

## 2021-07-02 RX ORDER — COVID-19 MOLECULAR TEST ASSAY
KIT MISCELLANEOUS
COMMUNITY
Start: 2021-06-13 | End: 2021-09-19

## 2021-07-02 ASSESSMENT — ENCOUNTER SYMPTOMS
SORE THROAT: 1
SHORTNESS OF BREATH: 0
NAUSEA: 0
SPUTUM PRODUCTION: 0
COUGH: 0
EYE DISCHARGE: 0
CHILLS: 1
DIZZINESS: 0
EYE REDNESS: 0
VOMITING: 0
ABDOMINAL PAIN: 0
HEADACHES: 0
DIARRHEA: 1
FEVER: 0
NECK PAIN: 0

## 2021-07-02 ASSESSMENT — LIFESTYLE VARIABLES: SUBSTANCE_ABUSE: 0

## 2021-07-02 NOTE — PROGRESS NOTES
Ava Pearce is a 20 y.o. female who presents for Sore Throat (bodyache, headache, diahhrea, ear pain, x1 week )      HPI this new problem.  Ava is 20-year-old female presents with a sore throat.  Associated symptoms include body aches, headache, diarrhea (1 time), and bilateral ear pain.  She felt feverish last night but did not measure temperature.  She took some Tylenol which helped alleviate her discomfort.  It hurts when she swallows.  Treatments tried none.  No other aggravating or alleviating factors.  She denies recent travel.  No exposure to persons with strep throat.    Review of Systems   Constitutional: Positive for chills. Negative for fever and malaise/fatigue.   HENT: Positive for sore throat. Negative for congestion and ear pain.    Eyes: Negative for discharge and redness.   Respiratory: Negative for cough, sputum production and shortness of breath.    Cardiovascular: Negative for chest pain.   Gastrointestinal: Positive for diarrhea. Negative for abdominal pain, nausea and vomiting.   Genitourinary: Negative for dysuria.   Musculoskeletal: Negative for neck pain.   Neurological: Negative for dizziness and headaches.   Endo/Heme/Allergies: Negative for environmental allergies.   Psychiatric/Behavioral: Negative for substance abuse.   All other systems reviewed and are negative.      Allergies:       Allergies   Allergen Reactions   • Pcn [Penicillins] Rash and Swelling   • Wellbutrin [Bupropion]        PMSFS Hx:  Past Medical History:   Diagnosis Date   • Allergy    • Anxiety    • ASTHMA    • Depression    • Heart burn      Past Surgical History:   Procedure Laterality Date   • TURBINOPLASTY Bilateral 7/26/2017    Procedure: TURBINOPLASTY;  Surgeon: Isis Camacho M.D.;  Location: SURGERY SAME DAY [facility];  Service:    • ANTROSTOMY  7/26/2017    Procedure: ANTROSTOMY ENDOSCOPIC MAXILLARY ;  Surgeon: Isis Camacho M.D.;  Location: SURGERY SAME DAY [facility];  Service:   "  • ETHMOIDECTOMY  7/26/2017    Procedure: ETHMOIDECTOMY ENDOSCOPIC TOTAL  ;  Surgeon: Isis Camacho M.D.;  Location: SURGERY SAME DAY [facility];  Service:      Family History   Problem Relation Age of Onset   • Depression Mother    • Depression Brother    • Drug abuse Brother    • Bipolar disorder Paternal Aunt    • Alcohol abuse Maternal Grandfather    • Depression Maternal Grandmother    • Depression Cousin      Social History     Tobacco Use   • Smoking status: Never Smoker   • Smokeless tobacco: Never Used   • Tobacco comment: Rarely \"vape\"   Substance Use Topics   • Alcohol use: Yes     Comment: weekly       Problems:   Patient Active Problem List   Diagnosis   • Major depressive disorder, recurrent episode, moderate (HCC)   • Attention deficit hyperactivity disorder, inattentive type   • Cannabis abuse, daily use   • Encounter for counseling regarding contraception   • Mild intermittent asthma without complication   • Bumps on skin   • Dysuria   • Epigastric pain   • Encounter for routine checking of intrauterine contraceptive device (IUD)   • Other fatigue   • Pelvic pain in female   • Lightheadedness   • Intractable migraine without aura and without status migrainosus   • TMJ pain dysfunction syndrome       Medications:   Current Outpatient Medications on File Prior to Visit   Medication Sig Dispense Refill   • sertraline (ZOLOFT) 100 MG Tab Take 2 Tablets by mouth every day. 180 tablet 3   • fluticasone (FLONASE) 50 MCG/ACT nasal spray Spray 1 Spray in nose every day.     • norethindrone-ethinyl estradiol (EVE FE 1/20) 1-20 MG-MCG per tablet Take 1 tablet by mouth every day. 84 tablet 3   • fluconazole (DIFLUCAN) 150 MG tablet Take 1 tablet by mouth weekly until symptoms resolve 4 tablet 0   • SUMAtriptan (IMITREX) 25 MG Tab tablet Take 1-4 Tablets by mouth one time as needed for Migraine for up to 1 dose. 10 tablet 3   • fexofenadine-pseudoephedrine (ALLEGRA-D)  MG per tablet Take 1 Tab " "by mouth 2 times a day. 30 Tab 0   • Olopatadine HCl 0.6 % Solution      • albuterol (VENTOLIN OR PROVENTIL) 108 (90 BASE) MCG/ACT AERS Inhale 2 Puffs by mouth every 6 hours as needed.         No current facility-administered medications on file prior to visit.          Objective:     /60 (BP Location: Right arm, Patient Position: Sitting, BP Cuff Size: Adult long)   Pulse 67   Temp 36.4 °C (97.5 °F) (Temporal)   Resp 16   Ht 1.626 m (5' 4\")   Wt 62.4 kg (137 lb 9.6 oz)   SpO2 95%   BMI 23.62 kg/m²     Physical Exam  Vitals and nursing note reviewed.   Constitutional:       General: She is not in acute distress.     Appearance: Normal appearance. She is well-developed and normal weight. She is not toxic-appearing.   HENT:      Head: Normocephalic.      Right Ear: Hearing, tympanic membrane, ear canal and external ear normal.      Left Ear: Hearing, tympanic membrane, ear canal and external ear normal.      Nose: Nose normal.      Right Sinus: No frontal sinus tenderness.      Left Sinus: No frontal sinus tenderness.      Mouth/Throat:      Mouth: Mucous membranes are moist.      Pharynx: Uvula midline. Posterior oropharyngeal erythema present. No oropharyngeal exudate.      Tonsils: No tonsillar abscesses.   Eyes:      General: Lids are normal.      Pupils: Pupils are equal, round, and reactive to light.   Neck:      Trachea: Trachea and phonation normal.   Cardiovascular:      Rate and Rhythm: Normal rate and regular rhythm.      Pulses: Normal pulses.   Pulmonary:      Effort: Pulmonary effort is normal. No respiratory distress.      Breath sounds: Normal breath sounds.   Abdominal:      Palpations: Abdomen is soft.   Musculoskeletal:         General: Normal range of motion.      Cervical back: Full passive range of motion without pain, normal range of motion and neck supple.   Lymphadenopathy:      Head:      Right side of head: Tonsillar adenopathy present.      Left side of head: Tonsillar " adenopathy present.      Cervical: No cervical adenopathy.      Upper Body:      Right upper body: No supraclavicular adenopathy.      Left upper body: No supraclavicular adenopathy.   Skin:     General: Skin is warm and dry.      Capillary Refill: Capillary refill takes less than 2 seconds.   Neurological:      Mental Status: She is alert and oriented to person, place, and time.      Deep Tendon Reflexes: Reflexes are normal and symmetric.   Psychiatric:         Speech: Speech normal.         Behavior: Behavior normal.     Rapid Strep A : positive      Assessment /Associated Orders:      1. Strep throat  azithromycin (ZITHROMAX) 250 MG Tab   2. Sore throat  POCT Rapid Strep A       Medical Decision Making:    Pt is clinically stable at today's acute urgent care visit.  No acute distress noted. Appropriate for outpatient management at this time.   Acute problem today with uncertain prognosis.    OTC  analgesic of choice (acetaminophen or NSAID). Follow manufactures dosing and safety precautions.   OTC Antipyretic of choice (Acetaminophen, Ibuprofen) for fevers greater than or equal to 101.5 degrees.   Salt water gargles BID and prn. Suggested 1/4 to 1/2 teaspoon (1.5 to 3.0 g) of salt per one cup (8 ounces or 250 mL) of warm water.   OTC throat analgesic spray or lozenge of choice prn throat pain. Dosage and directions per   Educated in proper administration of medication(s) ordered today including safety, possible SE, risks, benefits, rationale and alternatives to therapy.   Educated in infection control practices.      Advised to follow-up with the primary care provider for recheck, reevaluation, and consideration of further management if necessary.   Discussed management options (risks,benefits, and alternatives to treatment). Expressed understanding and the treatment plan was agreed upon. Questions were encouraged and answered       Return to urgent care prn if new or worsening sx or if there is no  improvement in condition prn.  Educated in Red flags and indications to immediately call 911 or present to the Emergency Department.     I personally reviewed prior external notes and test results pertinent to today's visit.  I have independently reviewed and interpreted all diagnostics ordered during this urgent care acute visit.     - no recent strep throat infection  - no recent antibiotic use.     Time spent evaluating this patient was at least 30 minutes and includes preparing for visit, counseling/education, exam and evaluation, obtaining history, independent interpretation, ordering lab/test/procedures,medication management and documentation.

## 2021-07-02 NOTE — LETTER
July 2, 2021       Patient: Ava Pearce   YOB: 2000   Date of Visit: 7/2/2021         To Whom It May Concern:    In my medical opinion, I recommend that Ava Pearce return to full duty, no restrictions on 0703/21          Sincerely,          TRISTA Fuentes.  Electronically Signed

## 2021-08-11 ENCOUNTER — HOSPITAL ENCOUNTER (OUTPATIENT)
Dept: LAB | Facility: MEDICAL CENTER | Age: 21
End: 2021-08-11
Attending: FAMILY MEDICINE
Payer: COMMERCIAL

## 2021-08-11 DIAGNOSIS — R53.83 OTHER FATIGUE: ICD-10-CM

## 2021-08-11 LAB
ANION GAP SERPL CALC-SCNC: 10 MMOL/L (ref 7–16)
BUN SERPL-MCNC: 10 MG/DL (ref 8–22)
CALCIUM SERPL-MCNC: 9.6 MG/DL (ref 8.5–10.5)
CHLORIDE SERPL-SCNC: 102 MMOL/L (ref 96–112)
CO2 SERPL-SCNC: 23 MMOL/L (ref 20–33)
CREAT SERPL-MCNC: 0.69 MG/DL (ref 0.5–1.4)
EST. AVERAGE GLUCOSE BLD GHB EST-MCNC: 108 MG/DL
GLUCOSE SERPL-MCNC: 78 MG/DL (ref 65–99)
HBA1C MFR BLD: 5.4 % (ref 4–5.6)
HCT VFR BLD AUTO: 42.5 % (ref 37–47)
HGB BLD-MCNC: 13.8 G/DL (ref 12–16)
IRON SATN MFR SERPL: 16 % (ref 15–55)
IRON SERPL-MCNC: 69 UG/DL (ref 40–170)
POTASSIUM SERPL-SCNC: 4.1 MMOL/L (ref 3.6–5.5)
SODIUM SERPL-SCNC: 135 MMOL/L (ref 135–145)
TIBC SERPL-MCNC: 435 UG/DL (ref 250–450)
UIBC SERPL-MCNC: 366 UG/DL (ref 110–370)

## 2021-08-11 PROCEDURE — 85018 HEMOGLOBIN: CPT

## 2021-08-11 PROCEDURE — 85014 HEMATOCRIT: CPT

## 2021-08-11 PROCEDURE — 84443 ASSAY THYROID STIM HORMONE: CPT

## 2021-08-11 PROCEDURE — 36415 COLL VENOUS BLD VENIPUNCTURE: CPT

## 2021-08-11 PROCEDURE — 80048 BASIC METABOLIC PNL TOTAL CA: CPT

## 2021-08-11 PROCEDURE — 83550 IRON BINDING TEST: CPT

## 2021-08-11 PROCEDURE — 83036 HEMOGLOBIN GLYCOSYLATED A1C: CPT

## 2021-08-11 PROCEDURE — 82607 VITAMIN B-12: CPT

## 2021-08-11 PROCEDURE — 82746 ASSAY OF FOLIC ACID SERUM: CPT

## 2021-08-11 PROCEDURE — 86038 ANTINUCLEAR ANTIBODIES: CPT

## 2021-08-11 PROCEDURE — 83540 ASSAY OF IRON: CPT

## 2021-08-12 LAB
FOLATE SERPL-MCNC: 9.8 NG/ML
TSH SERPL DL<=0.005 MIU/L-ACNC: 0.58 UIU/ML (ref 0.38–5.33)
VIT B12 SERPL-MCNC: 567 PG/ML (ref 211–911)

## 2021-08-13 LAB — NUCLEAR IGG SER QL IA: NORMAL

## 2021-09-19 ENCOUNTER — OFFICE VISIT (OUTPATIENT)
Dept: URGENT CARE | Facility: CLINIC | Age: 21
End: 2021-09-19
Payer: COMMERCIAL

## 2021-09-19 VITALS
OXYGEN SATURATION: 97 % | DIASTOLIC BLOOD PRESSURE: 70 MMHG | BODY MASS INDEX: 23.39 KG/M2 | SYSTOLIC BLOOD PRESSURE: 100 MMHG | WEIGHT: 137 LBS | TEMPERATURE: 97.8 F | RESPIRATION RATE: 16 BRPM | HEIGHT: 64 IN | HEART RATE: 75 BPM

## 2021-09-19 DIAGNOSIS — R10.2 ABDOMINAL PAIN, SUPRAPUBIC: ICD-10-CM

## 2021-09-19 DIAGNOSIS — R11.0 NAUSEA: ICD-10-CM

## 2021-09-19 DIAGNOSIS — N94.6 DYSMENORRHEA: ICD-10-CM

## 2021-09-19 DIAGNOSIS — L73.9 FOLLICULITIS: ICD-10-CM

## 2021-09-19 LAB
APPEARANCE UR: NORMAL
BILIRUB UR STRIP-MCNC: NEGATIVE MG/DL
COLOR UR AUTO: NORMAL
GLUCOSE UR STRIP.AUTO-MCNC: NEGATIVE MG/DL
INT CON NEG: NEGATIVE
INT CON POS: POSITIVE
KETONES UR STRIP.AUTO-MCNC: NORMAL MG/DL
LEUKOCYTE ESTERASE UR QL STRIP.AUTO: NEGATIVE
NITRITE UR QL STRIP.AUTO: NEGATIVE
PH UR STRIP.AUTO: 7 [PH] (ref 5–8)
POC URINE PREGNANCY TEST: NEGATIVE
PROT UR QL STRIP: NORMAL MG/DL
RBC UR QL AUTO: NORMAL
SP GR UR STRIP.AUTO: >=1.03
UROBILINOGEN UR STRIP-MCNC: NORMAL MG/DL

## 2021-09-19 PROCEDURE — 99214 OFFICE O/P EST MOD 30 MIN: CPT | Performed by: FAMILY MEDICINE

## 2021-09-19 PROCEDURE — 81002 URINALYSIS NONAUTO W/O SCOPE: CPT | Performed by: FAMILY MEDICINE

## 2021-09-19 PROCEDURE — 81025 URINE PREGNANCY TEST: CPT | Performed by: FAMILY MEDICINE

## 2021-09-19 RX ORDER — NAPROXEN 500 MG/1
500 TABLET ORAL 2 TIMES DAILY WITH MEALS
Qty: 20 TABLET | Refills: 0 | Status: SHIPPED | OUTPATIENT
Start: 2021-09-19 | End: 2021-09-29

## 2021-09-19 RX ORDER — ONDANSETRON 4 MG/1
4 TABLET, FILM COATED ORAL EVERY 4 HOURS PRN
Qty: 10 TABLET | Refills: 0 | Status: SHIPPED | OUTPATIENT
Start: 2021-09-19 | End: 2021-09-22

## 2021-09-19 RX ORDER — MUPIROCIN CALCIUM 20 MG/G
1 CREAM TOPICAL 2 TIMES DAILY
Qty: 30 G | Refills: 1 | Status: SHIPPED | OUTPATIENT
Start: 2021-09-19 | End: 2021-09-26

## 2021-09-19 ASSESSMENT — ENCOUNTER SYMPTOMS
FEVER: 0
MYALGIAS: 0
SHORTNESS OF BREATH: 0
VOMITING: 0
CHILLS: 0
ABDOMINAL PAIN: 1
SORE THROAT: 0
FLANK PAIN: 0
DIZZINESS: 0
NAUSEA: 1
COUGH: 0

## 2021-09-19 NOTE — PROGRESS NOTES
Subjective:   Ava Pearce is a 21 y.o. female who presents for GI Problem (woke up with sever stomach pain and causing nausea) and Rash ([id number] left shin)        Rash  This is a new problem. Episode onset: 3 weeks, left anterior leg. The affected locations include the left lower leg. The rash is characterized by redness (papular). She was exposed to nothing. Pertinent negatives include no cough, fever, shortness of breath, sore throat or vomiting. Treatments tried: Hygienic skin care. The treatment provided no relief.   Abdominal Pain  This is a recurrent (Complains of suprapubic abdominal pain, similar to previous episodes of ruptured ovarian cyst associated with missing 1 dose of OCPs) problem. The pain is located in the suprapubic region. The pain is mild. The quality of the pain is aching and cramping. Associated symptoms include nausea. Pertinent negatives include no dysuria, fever, frequency, hematuria, myalgias or vomiting. Associated symptoms comments: Last menstrual period current.     PMH:  has a past medical history of Allergy, Anxiety, ASTHMA, Depression, and Heart burn.  MEDS:   Current Outpatient Medications:   •  mupirocin calcium (BACTROBAN) 2 % Cream, Apply 1 Application topically 2 times a day for 7 days., Disp: 30 g, Rfl: 1  •  ondansetron (ZOFRAN) 4 MG Tab tablet, Take 1 Tablet by mouth every four hours as needed for Nausea/Vomiting for up to 3 days., Disp: 10 Tablet, Rfl: 0  •  naproxen (NAPROSYN) 500 MG Tab, Take 1 Tablet by mouth 2 times a day with meals for 10 days., Disp: 20 Tablet, Rfl: 0  •  SYMBICORT 80-4.5 MCG/ACT Aerosol, , Disp: , Rfl:   •  sertraline (ZOLOFT) 100 MG Tab, Take 2 Tablets by mouth every day., Disp: 180 tablet, Rfl: 3  •  norethindrone-ethinyl estradiol (EVE FE 1/20) 1-20 MG-MCG per tablet, Take 1 tablet by mouth every day., Disp: 84 tablet, Rfl: 3  •  SUMAtriptan (IMITREX) 25 MG Tab tablet, Take 1-4 Tablets by mouth one time as needed for Migraine for up to  1 dose., Disp: 10 tablet, Rfl: 3  •  fexofenadine-pseudoephedrine (ALLEGRA-D)  MG per tablet, Take 1 Tab by mouth 2 times a day., Disp: 30 Tab, Rfl: 0  •  fluticasone (FLONASE) 50 MCG/ACT nasal spray, Spray 1 Spray in nose every day., Disp: , Rfl:   •  albuterol (VENTOLIN OR PROVENTIL) 108 (90 BASE) MCG/ACT AERS, Inhale 2 Puffs by mouth every 6 hours as needed.  , Disp: , Rfl:   ALLERGIES:   Allergies   Allergen Reactions   • Pcn [Penicillins] Rash and Swelling   • Wellbutrin [Bupropion]      SURGHX:   Past Surgical History:   Procedure Laterality Date   • TURBINOPLASTY Bilateral 7/26/2017    Procedure: TURBINOPLASTY;  Surgeon: Isis Camacho M.D.;  Location: SURGERY SAME DAY [facility];  Service:    • ANTROSTOMY  7/26/2017    Procedure: ANTROSTOMY ENDOSCOPIC MAXILLARY ;  Surgeon: Isis Camacho M.D.;  Location: SURGERY SAME DAY [facility];  Service:    • ETHMOIDECTOMY  7/26/2017    Procedure: ETHMOIDECTOMY ENDOSCOPIC TOTAL  ;  Surgeon: Isis Camacho M.D.;  Location: SURGERY SAME DAY [facility];  Service:      SOCHX:  reports that she has never smoked. She has never used smokeless tobacco. She reports current alcohol use. She reports current drug use. Drug: Marijuana.  FH:   Family History   Problem Relation Age of Onset   • Depression Mother    • Depression Brother    • Drug abuse Brother    • Bipolar disorder Paternal Aunt    • Alcohol abuse Maternal Grandfather    • Depression Maternal Grandmother    • Depression Cousin      Review of Systems   Constitutional: Negative for chills and fever.   HENT: Negative for sore throat.    Respiratory: Negative for cough and shortness of breath.    Gastrointestinal: Positive for abdominal pain (suprapubic, onset this morning) and nausea. Negative for vomiting.   Genitourinary: Negative for dysuria, flank pain, frequency, hematuria and urgency.   Musculoskeletal: Negative for myalgias.   Skin: Positive for rash.   Neurological: Negative for  "dizziness.        Objective:   /70   Pulse 75   Temp 36.6 °C (97.8 °F) (Temporal)   Resp 16   Ht 1.626 m (5' 4\")   Wt 62.1 kg (137 lb)   SpO2 97%   BMI 23.52 kg/m²   Physical Exam  Vitals and nursing note reviewed.   Constitutional:       General: She is not in acute distress.     Appearance: She is well-developed.   HENT:      Head: Normocephalic and atraumatic.      Right Ear: External ear normal.      Left Ear: External ear normal.      Nose: Nose normal.      Mouth/Throat:      Mouth: Mucous membranes are moist.   Eyes:      Conjunctiva/sclera: Conjunctivae normal.   Cardiovascular:      Rate and Rhythm: Normal rate and regular rhythm.   Pulmonary:      Effort: Pulmonary effort is normal. No respiratory distress.      Breath sounds: Normal breath sounds. No wheezing or rhonchi.   Abdominal:      General: Abdomen is flat. There is no distension.      Palpations: Abdomen is soft.      Tenderness: There is no abdominal tenderness. There is no right CVA tenderness, left CVA tenderness, guarding or rebound.   Musculoskeletal:         General: Normal range of motion.   Skin:     General: Skin is warm and dry.      Findings: Rash present. Rash is papular (anterior left leg, consistent with folliculitis).   Neurological:      General: No focal deficit present.      Mental Status: She is alert and oriented to person, place, and time. Mental status is at baseline.      Gait: Gait (gait at baseline) normal.   Psychiatric:         Judgment: Judgment normal.     Urine pregnancy beta-hCG: Negative      Assessment/Plan:   1. Folliculitis  - mupirocin calcium (BACTROBAN) 2 % Cream; Apply 1 Application topically 2 times a day for 7 days.  Dispense: 30 g; Refill: 1    2. Abdominal pain, suprapubic  - POCT Urinalysis  - POCT Pregnancy    3. Dysmenorrhea  - naproxen (NAPROSYN) 500 MG Tab; Take 1 Tablet by mouth 2 times a day with meals for 10 days.  Dispense: 20 Tablet; Refill: 0    4. Nausea  - ondansetron (ZOFRAN) 4 " MG Tab tablet; Take 1 Tablet by mouth every four hours as needed for Nausea/Vomiting for up to 3 days.  Dispense: 10 Tablet; Refill: 0        Medical Decision Making/Course:  In the course of preparing for this visit with review of the pertinent past medical history, recent and past clinic visits, current medications, and performing chart, immunization, medical history and medication reconciliation, and in the further course of obtaining the current history pertinent to the clinic visit today, performing an exam and evaluation, ordering and independently evaluating labs, tests, and/or procedures, prescribing any recommended new medications as noted above, providing any pertinent counseling and education and recommending further coordination of care, at least 30 minutes of total time were spent during this encounter.      Discussed close monitoring, return precautions, and supportive measures of maintaining adequate fluid hydration and caloric intake, relative rest and symptom management as needed for pain and/or fever.    Differential diagnosis, natural history, supportive care, and indications for immediate follow-up discussed.     Advised the patient to follow-up with the primary care physician for recheck, reevaluation, and consideration of further management.    Please note that this dictation was created using voice recognition software. I have worked with consultants from the vendor as well as technical experts from [facility] [employer] to optimize the interface. I have made every reasonable attempt to correct obvious errors, but I expect that there are errors of grammar and possibly content that I did not discover before finalizing the note.

## 2021-09-19 NOTE — PATIENT INSTRUCTIONS
Folliculitis    Folliculitis is inflammation of the hair follicles. Folliculitis most commonly occurs on the scalp, thighs, legs, back, and buttocks. However, it can occur anywhere on the body.  What are the causes?  This condition may be caused by:  · A bacterial infection (common).  · A fungal infection.  · A viral infection.  · Contact with certain chemicals, especially oils and tars.  · Shaving or waxing.  · Greasy ointments or creams applied to the skin.  Long-lasting folliculitis and folliculitis that keeps coming back may be caused by bacteria. This bacteria can live anywhere on your skin and is often found in the nostrils.  What increases the risk?  You are more likely to develop this condition if you have:  · A weakened immune system.  · Diabetes.  · Obesity.  What are the signs or symptoms?  Symptoms of this condition include:  · Redness.  · Soreness.  · Swelling.  · Itching.  · Small white or yellow, pus-filled, itchy spots (pustules) that appear over a reddened area. If there is an infection that goes deep into the follicle, these may develop into a boil (furuncle).  · A group of closely packed boils (carbuncle). These tend to form in hairy, sweaty areas of the body.  How is this diagnosed?  This condition is diagnosed with a skin exam. To find what is causing the condition, your health care provider may take a sample of one of the pustules or boils for testing in a lab.  How is this treated?  This condition may be treated by:  · Applying warm compresses to the affected areas.  · Taking an antibiotic medicine or applying an antibiotic medicine to the skin.  · Applying or bathing with an antiseptic solution.  · Taking an over-the-counter medicine to help with itching.  · Having a procedure to drain any pustules or boils. This may be done if a pustule or boil contains a lot of pus or fluid.  · Having laser hair removal. This may be done to treat long-lasting folliculitis.  Follow these instructions at  home:  Managing pain and swelling    · If directed, apply heat to the affected area as often as told by your health care provider. Use the heat source that your health care provider recommends, such as a moist heat pack or a heating pad.  ? Place a towel between your skin and the heat source.  ? Leave the heat on for 20-30 minutes.  ? Remove the heat if your skin turns bright red. This is especially important if you are unable to feel pain, heat, or cold. You may have a greater risk of getting burned.  General instructions  · If you were prescribed an antibiotic medicine, take it or apply it as told by your health care provider. Do not stop using the antibiotic even if your condition improves.  · Check the irritated area every day for signs of infection. Check for:  ? Redness, swelling, or pain.  ? Fluid or blood.  ? Warmth.  ? Pus or a bad smell.  · Do not shave irritated skin.  · Take over-the-counter and prescription medicines only as told by your health care provider.  · Keep all follow-up visits as told by your health care provider. This is important.  Get help right away if:  · You have more redness, swelling, or pain in the affected area.  · Red streaks are spreading from the affected area.  · You have a fever.  Summary  · Folliculitis is inflammation of the hair follicles. Folliculitis most commonly occurs on the scalp, thighs, legs, back, and buttocks.  · This condition may be treated by taking an antibiotic medicine or applying an antibiotic medicine to the skin, and applying or bathing with an antiseptic solution.  · If you were prescribed an antibiotic medicine, take it or apply it as told by your health care provider. Do not stop using the antibiotic even if your condition improves.  · Get help right away if you have new or worsening symptoms.  · Keep all follow-up visits as told by your health care provider. This is important.  This information is not intended to replace advice given to you by your  health care provider. Make sure you discuss any questions you have with your health care provider.  Document Released: 02/26/2003 Document Revised: 07/27/2019 Document Reviewed: 07/27/2019  Elsevier Patient Education © 2020 [employer]  Dysmenorrhea  Dysmenorrhea means painful cramps during your period (menstrual period). You will have pain in your lower belly (abdomen). The pain is caused by the tightening (jared) of the muscles of the womb (uterus). The pain may be mild or very bad. With this condition, you may:  · Have a headache.  · Feel sick to your stomach (nauseous).  · Throw up (vomit).  · Have lower back pain.  Follow these instructions at home:  Helping pain and cramping    · Put heat on your lower back or belly when you have pain or cramps. Use the heat source that your doctor tells you to use.  ? Place a towel between your skin and the heat.  ? Leave the heat on for 20-30 minutes.  ? Remove the heat if your skin turns bright red. This is especially important if you cannot feel pain, heat, or cold.  ? Do not have a heating pad on during sleep.  · Do aerobic exercises. These include walking, swimming, or biking. These may help with cramps.  · Massage your lower back or belly. This may help lessen pain.  General instructions  · Take over-the-counter and prescription medicines only as told by your doctor.  · Do not drive or use heavy machinery while taking prescription pain medicine.  · Avoid alcohol and caffeine during and right before your period. These can make cramps worse.  · Do not use any products that have nicotine or tobacco. These include cigarettes and e-cigarettes. If you need help quitting, ask your doctor.  · Keep all follow-up visits as told by your doctor. This is important.  Contact a doctor if:  · You have pain that gets worse.  · You have pain that does not get better with medicine.  · You have pain during sex.  · You feel sick to your stomach or you throw up during your period,  and medicine does not help.  Get help right away if:  · You pass out (faint).  Summary  · Dysmenorrhea means painful cramps during your period (menstrual period).  · Put heat on your lower back or belly when you have pain or cramps.  · Do exercises like walking, swimming, or biking to help with cramps.  · Contact a doctor if you have pain during sex.  This information is not intended to replace advice given to you by your health care provider. Make sure you discuss any questions you have with your health care provider.  Document Released: 03/16/2010 Document Revised: 11/30/2018 Document Reviewed: 01/04/2018  Elsevier Patient Education © 2020 [employer] Inc[employer]

## 2021-09-22 ENCOUNTER — OFFICE VISIT (OUTPATIENT)
Dept: MEDICAL GROUP | Facility: MEDICAL CENTER | Age: 21
End: 2021-09-22
Payer: COMMERCIAL

## 2021-09-22 VITALS
WEIGHT: 138.4 LBS | TEMPERATURE: 99.3 F | HEART RATE: 66 BPM | RESPIRATION RATE: 18 BRPM | SYSTOLIC BLOOD PRESSURE: 108 MMHG | DIASTOLIC BLOOD PRESSURE: 62 MMHG | BODY MASS INDEX: 23.63 KG/M2 | OXYGEN SATURATION: 96 % | HEIGHT: 64 IN

## 2021-09-22 DIAGNOSIS — Z30.41 ENCOUNTER FOR BIRTH CONTROL PILLS MAINTENANCE: ICD-10-CM

## 2021-09-22 DIAGNOSIS — R10.2 PELVIC PAIN IN FEMALE: ICD-10-CM

## 2021-09-22 DIAGNOSIS — N83.209 RUPTURED OVARIAN CYST: ICD-10-CM

## 2021-09-22 PROCEDURE — 99214 OFFICE O/P EST MOD 30 MIN: CPT | Performed by: PHYSICIAN ASSISTANT

## 2021-09-22 RX ORDER — NORETHINDRONE ACETATE AND ETHINYL ESTRADIOL 1.5-30(21)
1 KIT ORAL DAILY
Qty: 84 TABLET | Refills: 1 | Status: SHIPPED
Start: 2021-09-22 | End: 2021-12-30

## 2021-09-22 ASSESSMENT — PATIENT HEALTH QUESTIONNAIRE - PHQ9
CLINICAL INTERPRETATION OF PHQ2 SCORE: 4
5. POOR APPETITE OR OVEREATING: 1 - SEVERAL DAYS

## 2021-09-22 NOTE — LETTER
September 22, 2021        RE: Ava Pearce          Patient presents today for follow up after urgent care visit. She unfortunately continues with severe pelvic pain. Please excuse her from work today.          Thank you for your time,            Sofia Frey P.A.-C.

## 2021-09-23 ENCOUNTER — HOSPITAL ENCOUNTER (OUTPATIENT)
Dept: RADIOLOGY | Facility: MEDICAL CENTER | Age: 21
End: 2021-09-23
Attending: FAMILY MEDICINE
Payer: COMMERCIAL

## 2021-09-23 DIAGNOSIS — R10.2 PELVIC PAIN IN FEMALE: ICD-10-CM

## 2021-09-23 PROCEDURE — 76830 TRANSVAGINAL US NON-OB: CPT

## 2021-09-23 NOTE — ASSESSMENT & PLAN NOTE
Patient presents for follow up on recurrent pelvic pain. Thought to be related to ovarian cyst and ovarian cyst rupture from past history and past ultrasounds. Issue for a few years. Getting worse. Tried IUD but it didn't help. Now on BCPs. Seen in urgent care on 9/19 but US not available. She would like to have that ordered now. Pain is 7/10 and causing body aches and nausea. Prescribed naproxen but not taking. Trying to establish with new GYN. No fever but does feel chilled. No blood in stool. No h/o colitis, appendicitis. No urinary symptoms.

## 2021-09-23 NOTE — PROGRESS NOTES
Subjective:   Ava Pearce is a 21 y.o. female here today for f/u on urgent care visit. PCP not available and patient requested same day access.     Pelvic pain in female  Patient presents for follow up on recurrent pelvic pain. Thought to be related to ovarian cyst and ovarian cyst rupture from past history and past ultrasounds. Issue for a few years. Getting worse. Tried IUD but it didn't help. Now on BCPs. Seen in urgent care on 9/19 but US not available. She would like to have that ordered now. Pain is 7/10 and causing body aches and nausea. Prescribed naproxen but not taking. Trying to establish with new GYN. No fever but does feel chilled. No blood in stool. No h/o colitis, appendicitis. No urinary symptoms.       Current medicines (including changes today)  Current Outpatient Medications   Medication Sig Dispense Refill   • norethindrone-ethinyl estradiol-iron (MICROGESTIN FE1.5/30) 1.5-30 MG-MCG tablet Take 1 Tablet by mouth every day. 84 Tablet 1   • naproxen (NAPROSYN) 500 MG Tab Take 1 Tablet by mouth 2 times a day with meals for 10 days. 20 Tablet 0   • SYMBICORT 80-4.5 MCG/ACT Aerosol      • sertraline (ZOLOFT) 100 MG Tab Take 2 Tablets by mouth every day. 180 tablet 3   • norethindrone-ethinyl estradiol (EVE FE 1/20) 1-20 MG-MCG per tablet Take 1 tablet by mouth every day. 84 tablet 3   • SUMAtriptan (IMITREX) 25 MG Tab tablet Take 1-4 Tablets by mouth one time as needed for Migraine for up to 1 dose. 10 tablet 3   • fexofenadine-pseudoephedrine (ALLEGRA-D)  MG per tablet Take 1 Tab by mouth 2 times a day. 30 Tab 0   • fluticasone (FLONASE) 50 MCG/ACT nasal spray Spray 1 Spray in nose every day.     • albuterol (VENTOLIN OR PROVENTIL) 108 (90 BASE) MCG/ACT AERS Inhale 2 Puffs by mouth every 6 hours as needed.       • mupirocin calcium (BACTROBAN) 2 % Cream Apply 1 Application topically 2 times a day for 7 days. (Patient not taking: Reported on 9/22/2021) 30 g 1     No current  "facility-administered medications for this visit.     She  has a past medical history of Allergy, Anxiety, ASTHMA, Depression, and Heart burn.    ROS    No weight change. No headache/dizziness. No focal weakness. No sore throat, nasal congestion, ear pain. No chest pain, no shortness of breath, difficulty breathing.No urinary complaint. No rash or skin lesion. No joint pain or swelling.       Objective:     /62 (BP Location: Left arm, Patient Position: Sitting, BP Cuff Size: Adult long)   Pulse 66   Temp 37.4 °C (99.3 °F) (Temporal)   Resp 18   Ht 1.626 m (5' 4\")   Wt 62.8 kg (138 lb 6.4 oz)   SpO2 96%  Body mass index is 23.76 kg/m².   Physical Exam:  Constitutional: WDWN, NAD  Skin: Warm, dry, good turgor, no rashes in visible areas.  Psych: Alert and oriented x3, normal affect and mood.      Assessment and Plan:   The following treatment plan was discussed    1. Pelvic pain in female    - norethindrone-ethinyl estradiol-iron (MICROGESTIN FE1.5/30) 1.5-30 MG-MCG tablet; Take 1 Tablet by mouth every day.  Dispense: 84 Tablet; Refill: 1    2. Ruptured ovarian cyst    - norethindrone-ethinyl estradiol-iron (MICROGESTIN FE1.5/30) 1.5-30 MG-MCG tablet; Take 1 Tablet by mouth every day.  Dispense: 84 Tablet; Refill: 1    3. Encounter for birth control pills maintenance    - norethindrone-ethinyl estradiol-iron (MICROGESTIN FE1.5/30) 1.5-30 MG-MCG tablet; Take 1 Tablet by mouth every day.  Dispense: 84 Tablet; Refill: 1      Followup: f/u with PCP         "

## 2021-11-04 ENCOUNTER — APPOINTMENT (OUTPATIENT)
Dept: URGENT CARE | Facility: CLINIC | Age: 21
End: 2021-11-04
Payer: COMMERCIAL

## 2021-11-04 ENCOUNTER — HOSPITAL ENCOUNTER (OUTPATIENT)
Facility: MEDICAL CENTER | Age: 21
End: 2021-11-04
Attending: PHYSICIAN ASSISTANT
Payer: COMMERCIAL

## 2021-11-04 ENCOUNTER — OFFICE VISIT (OUTPATIENT)
Dept: URGENT CARE | Facility: CLINIC | Age: 21
End: 2021-11-04
Payer: COMMERCIAL

## 2021-11-04 VITALS
WEIGHT: 140.6 LBS | RESPIRATION RATE: 16 BRPM | HEIGHT: 64 IN | SYSTOLIC BLOOD PRESSURE: 120 MMHG | TEMPERATURE: 97.8 F | BODY MASS INDEX: 24.01 KG/M2 | OXYGEN SATURATION: 97 % | DIASTOLIC BLOOD PRESSURE: 60 MMHG | HEART RATE: 91 BPM

## 2021-11-04 DIAGNOSIS — R05.9 COUGH: ICD-10-CM

## 2021-11-04 DIAGNOSIS — Z00.00 HEALTHCARE MAINTENANCE: ICD-10-CM

## 2021-11-04 DIAGNOSIS — Z20.822 CLOSE EXPOSURE TO COVID-19 VIRUS: ICD-10-CM

## 2021-11-04 LAB
EXTERNAL QUALITY CONTROL: NORMAL
SARS-COV+SARS-COV-2 AG RESP QL IA.RAPID: NEGATIVE

## 2021-11-04 PROCEDURE — 87426 SARSCOV CORONAVIRUS AG IA: CPT | Performed by: PHYSICIAN ASSISTANT

## 2021-11-04 PROCEDURE — U0005 INFEC AGEN DETEC AMPLI PROBE: HCPCS

## 2021-11-04 PROCEDURE — U0003 INFECTIOUS AGENT DETECTION BY NUCLEIC ACID (DNA OR RNA); SEVERE ACUTE RESPIRATORY SYNDROME CORONAVIRUS 2 (SARS-COV-2) (CORONAVIRUS DISEASE [COVID-19]), AMPLIFIED PROBE TECHNIQUE, MAKING USE OF HIGH THROUGHPUT TECHNOLOGIES AS DESCRIBED BY CMS-2020-01-R: HCPCS

## 2021-11-04 PROCEDURE — 99213 OFFICE O/P EST LOW 20 MIN: CPT | Mod: CS | Performed by: PHYSICIAN ASSISTANT

## 2021-11-04 ASSESSMENT — ENCOUNTER SYMPTOMS
SHORTNESS OF BREATH: 1
SPUTUM PRODUCTION: 0
FEVER: 0
PALPITATIONS: 0
CHILLS: 0
WHEEZING: 1
COUGH: 1

## 2021-11-04 NOTE — PROGRESS NOTES
Subjective:   Ava Pearce is a 21 y.o. female who presents today with   Chief Complaint   Patient presents with   • Coronavirus Screening     Exposure at work, runny nose, chest pain, cough, sore throat, x2 days    • Requesting Labs       Cough  This is a new problem. Episode onset: 2 days. Associated symptoms include chest pain (with coughing ), shortness of breath and wheezing. Pertinent negatives include no chills or fever. She has tried a beta-agonist inhaler for the symptoms. The treatment provided mild relief. Her past medical history is significant for asthma.   Patient states she has had 1 of 2 Moderna shot and was due for her next in a week.  I personally donned proper PPE throughout visit today.   Has had to use inhaler more frequently over the last couple days.    PMH:  has a past medical history of Allergy, Anxiety, ASTHMA, Depression, and Heart burn.  MEDS:   Current Outpatient Medications:   •  SYMBICORT 80-4.5 MCG/ACT Aerosol, , Disp: , Rfl:   •  sertraline (ZOLOFT) 100 MG Tab, Take 2 Tablets by mouth every day., Disp: 180 tablet, Rfl: 3  •  norethindrone-ethinyl estradiol (EVE FE 1/20) 1-20 MG-MCG per tablet, Take 1 tablet by mouth every day., Disp: 84 tablet, Rfl: 3  •  fexofenadine-pseudoephedrine (ALLEGRA-D)  MG per tablet, Take 1 Tab by mouth 2 times a day., Disp: 30 Tab, Rfl: 0  •  fluticasone (FLONASE) 50 MCG/ACT nasal spray, Spray 1 Spray in nose every day., Disp: , Rfl:   •  albuterol (VENTOLIN OR PROVENTIL) 108 (90 BASE) MCG/ACT AERS, Inhale 2 Puffs by mouth every 6 hours as needed.  , Disp: , Rfl:   •  norethindrone-ethinyl estradiol-iron (MICROGESTIN FE1.5/30) 1.5-30 MG-MCG tablet, Take 1 Tablet by mouth every day., Disp: 84 Tablet, Rfl: 1  •  SUMAtriptan (IMITREX) 25 MG Tab tablet, Take 1-4 Tablets by mouth one time as needed for Migraine for up to 1 dose., Disp: 10 tablet, Rfl: 3  ALLERGIES:   Allergies   Allergen Reactions   • Pcn [Penicillins] Rash and Swelling   •  "Wellbutrin [Bupropion]      SURGHX:   Past Surgical History:   Procedure Laterality Date   • TURBINOPLASTY Bilateral 7/26/2017    Procedure: TURBINOPLASTY;  Surgeon: Isis Camacho M.D.;  Location: SURGERY SAME DAY [facility];  Service:    • ANTROSTOMY  7/26/2017    Procedure: ANTROSTOMY ENDOSCOPIC MAXILLARY ;  Surgeon: Isis Camacho M.D.;  Location: SURGERY SAME DAY [facility];  Service:    • ETHMOIDECTOMY  7/26/2017    Procedure: ETHMOIDECTOMY ENDOSCOPIC TOTAL  ;  Surgeon: Isis Camacho M.D.;  Location: SURGERY SAME DAY [facility];  Service:      SOCHX:  reports that she has never smoked. She has never used smokeless tobacco. She reports current alcohol use. She reports current drug use. Drug: Marijuana.  FH: Reviewed with patient, not pertinent to this visit.       Review of Systems   Constitutional: Negative for chills and fever.   Respiratory: Positive for cough, shortness of breath and wheezing. Negative for sputum production.    Cardiovascular: Positive for chest pain (with coughing ). Negative for palpitations.        Objective:   /60 (BP Location: Left arm, Patient Position: Sitting, BP Cuff Size: Adult long)   Pulse 91   Temp 36.6 °C (97.8 °F) (Temporal)   Resp 16   Ht 1.626 m (5' 4\")   Wt 63.8 kg (140 lb 9.6 oz)   SpO2 97%   BMI 24.13 kg/m²   Physical Exam  Vitals and nursing note reviewed.   Constitutional:       General: She is not in acute distress.     Appearance: Normal appearance. She is well-developed. She is not ill-appearing.   HENT:      Head: Normocephalic and atraumatic.      Right Ear: Hearing, tympanic membrane and ear canal normal.      Left Ear: Hearing, tympanic membrane and ear canal normal.      Mouth/Throat:      Mouth: Mucous membranes are moist.      Pharynx: No oropharyngeal exudate or posterior oropharyngeal erythema.   Eyes:      Conjunctiva/sclera: Conjunctivae normal.   Cardiovascular:      Rate and Rhythm: Normal rate and regular rhythm.      " Heart sounds: Normal heart sounds.   Pulmonary:      Effort: Pulmonary effort is normal.      Breath sounds: No stridor. Wheezing (mild ) present. No rhonchi or rales.   Musculoskeletal:      Comments: Normal movement in all 4 extremities   Skin:     General: Skin is warm and dry.   Neurological:      Mental Status: She is alert.      Coordination: Coordination normal.   Psychiatric:         Mood and Affect: Mood normal.       COVID POCT negative    Assessment/Plan:   Assessment    1. Cough  - POCT SARS-COV Antigen DWAYNE (Symptomatic Only)  - SARS-CoV-2 PCR (24 hour In-House): Collect NP swab in VTM; Future    2. Close exposure to COVID-19 virus  - POCT SARS-COV Antigen DWAYNE (Symptomatic Only)  - SARS-CoV-2 PCR (24 hour In-House): Collect NP swab in VTM; Future    3. Healthcare maintenance  - Referral back to [facility] PCP  Symptoms and presentation most consistent with viral illness at this time and we will rule out COVID.   Patient also would like to have lab work done and she will plan on follow up with her PCP for this.  Discussed CDC guidelines including self isolation at home.   Patient encouraged to get plenty of rest, use OTC tylenol for pain/fever, and drink plenty of fluids.  Differential diagnosis, natural history, supportive care, and indications for immediate follow-up discussed.   Patient given instructions and understanding of medications and treatment.    If not improving in 3-5 days, F/U with PCP or return to  if symptoms worsen.    Patient agreeable to plan.  Greater than 20 minutes were spent reviewing patient's chart, examining and obtaining history from patient, and discussing plan of care.     Please note that this dictation was created using voice recognition software. I have made every reasonable attempt to correct obvious errors, but I expect that there are errors of grammar and possibly content that I did not discover before finalizing the note.    Burt Tomlinson PA-C

## 2021-11-05 DIAGNOSIS — R05.9 COUGH: ICD-10-CM

## 2021-11-05 DIAGNOSIS — Z20.822 CLOSE EXPOSURE TO COVID-19 VIRUS: ICD-10-CM

## 2021-11-05 LAB
COVID ORDER STATUS COVID19: NORMAL
SARS-COV-2 RNA RESP QL NAA+PROBE: NOTDETECTED
SPECIMEN SOURCE: NORMAL

## 2021-12-30 ENCOUNTER — OFFICE VISIT (OUTPATIENT)
Dept: MEDICAL GROUP | Facility: MEDICAL CENTER | Age: 21
End: 2021-12-30
Payer: COMMERCIAL

## 2021-12-30 VITALS
HEIGHT: 64 IN | OXYGEN SATURATION: 98 % | DIASTOLIC BLOOD PRESSURE: 64 MMHG | HEART RATE: 75 BPM | WEIGHT: 136.69 LBS | SYSTOLIC BLOOD PRESSURE: 102 MMHG | BODY MASS INDEX: 23.34 KG/M2 | TEMPERATURE: 98 F

## 2021-12-30 DIAGNOSIS — Z23 NEED FOR VACCINATION: Primary | ICD-10-CM

## 2021-12-30 DIAGNOSIS — R53.83 OTHER FATIGUE: ICD-10-CM

## 2021-12-30 DIAGNOSIS — Z30.46 ENCOUNTER FOR SURVEILLANCE OF IMPLANTABLE SUBDERMAL CONTRACEPTIVE: ICD-10-CM

## 2021-12-30 DIAGNOSIS — F33.1 MAJOR DEPRESSIVE DISORDER, RECURRENT EPISODE, MODERATE (HCC): ICD-10-CM

## 2021-12-30 PROCEDURE — 90686 IIV4 VACC NO PRSV 0.5 ML IM: CPT | Performed by: FAMILY MEDICINE

## 2021-12-30 PROCEDURE — 90471 IMMUNIZATION ADMIN: CPT | Performed by: FAMILY MEDICINE

## 2021-12-30 PROCEDURE — 99214 OFFICE O/P EST MOD 30 MIN: CPT | Mod: 25 | Performed by: FAMILY MEDICINE

## 2021-12-30 NOTE — ASSESSMENT & PLAN NOTE
She is interested in decreasing her zoloft   She is doing well from this standpoint   rx sent and dosing instructions discussed

## 2021-12-30 NOTE — ASSESSMENT & PLAN NOTE
Feeling fatigued for about 8 months   She has been iron def in the past and we will recheck this   Broad labs ordered to exclude/evaluate for organic cause   She had mono in the remote past       20+ min spent

## 2021-12-30 NOTE — PROGRESS NOTES
This medical record contains text that has been entered with the assistance of computer voice recognition and dictation software.  Therefore, it may contain unintended errors in text, spelling, punctuation, or grammar        Chief Complaint   Patient presents with   • Fatigue     x 8 months   • Lab Results   • Other     antidepressant       Ava Pearce is a 21 y.o. female here evaluation and management of:    1) would like to wean down on anti-depressant   2) would like to evaluate fatigue       Current Outpatient Medications   Medication Sig Dispense Refill   • sertraline (ZOLOFT) 50 MG Tab Take 3 Tablets by mouth every day. Decrease by 50mg every 2 weeks until desired dose is achieved 270 Tablet 11   • SYMBICORT 80-4.5 MCG/ACT Aerosol      • sertraline (ZOLOFT) 100 MG Tab Take 2 Tablets by mouth every day. 180 tablet 3   • SUMAtriptan (IMITREX) 25 MG Tab tablet Take 1-4 Tablets by mouth one time as needed for Migraine for up to 1 dose. 10 tablet 3   • fexofenadine-pseudoephedrine (ALLEGRA-D)  MG per tablet Take 1 Tab by mouth 2 times a day. 30 Tab 0   • fluticasone (FLONASE) 50 MCG/ACT nasal spray Spray 1 Spray in nose every day.     • albuterol (VENTOLIN OR PROVENTIL) 108 (90 BASE) MCG/ACT AERS Inhale 2 Puffs by mouth every 6 hours as needed.         No current facility-administered medications for this visit.     Patient Active Problem List    Diagnosis Date Noted   • Encounter for surveillance of implantable subdermal contraceptive 12/30/2021   • Lightheadedness 02/10/2021   • Intractable migraine without aura and without status migrainosus 02/10/2021   • TMJ pain dysfunction syndrome 02/10/2021   • Other fatigue 01/14/2021   • Pelvic pain in female 01/14/2021   • Encounter for routine checking of intrauterine contraceptive device (IUD) 06/03/2020   • Epigastric pain 10/30/2019   • Bumps on skin 12/26/2018   • Dysuria 12/26/2018   • Encounter for counseling regarding contraception 08/20/2018   •  "Mild intermittent asthma without complication 08/20/2018   • Cannabis abuse, daily use 08/01/2018   • Attention deficit hyperactivity disorder, inattentive type 01/24/2017   • Major depressive disorder, recurrent episode, moderate (HCC) 10/27/2016     Past Surgical History:   Procedure Laterality Date   • TURBINOPLASTY Bilateral 7/26/2017    Procedure: TURBINOPLASTY;  Surgeon: Isis Camacho M.D.;  Location: SURGERY SAME DAY [facility];  Service:    • ANTROSTOMY  7/26/2017    Procedure: ANTROSTOMY ENDOSCOPIC MAXILLARY ;  Surgeon: Isis Camacho M.D.;  Location: SURGERY SAME DAY [facility];  Service:    • ETHMOIDECTOMY  7/26/2017    Procedure: ETHMOIDECTOMY ENDOSCOPIC TOTAL  ;  Surgeon: Isis Camacho M.D.;  Location: SURGERY SAME DAY [facility];  Service:       Social History     Tobacco Use   • Smoking status: Never Smoker   • Smokeless tobacco: Never Used   • Tobacco comment: Rarely \"vape\"   Vaping Use   • Vaping Use: Former   • Substances: Nicotine   Substance Use Topics   • Alcohol use: Yes     Comment: weekly 2 drinks   • Drug use: Yes     Types: Marijuana     Comment: daily     Family History   Problem Relation Age of Onset   • Depression Mother    • Depression Brother    • Drug abuse Brother    • Bipolar disorder Paternal Aunt    • Alcohol abuse Maternal Grandfather    • Depression Maternal Grandmother    • Depression Cousin            ROS    all review of system completed and negative except for those listed above     Objective:     /64 (BP Location: Right arm, Patient Position: Sitting, BP Cuff Size: Adult)   Pulse 75   Temp 36.7 °C (98 °F) (Temporal)   Ht 1.626 m (5' 4\")   Wt 62 kg (136 lb 11 oz)   SpO2 98%  Body mass index is 23.46 kg/m².  Physical Exam:        GEN: comfortable, alert and oriented, well nourished, well developed, in no apparent distress   HEENT: NCAT, eyes: pupils equal and reactive, sclera white, EOMIT, good dentition  HEART: limbs warm and well perfused, " regular rate, no JVD, no lower extremity edema  LUNGS: speaking in full sentences, not in apparent respiratory distress, no audible wheezes  MSK: normal tone and bulk, no swelling of the joints, gait steady and normal         Assessment and Plan:   The following treatment plan was discussed        Problem List Items Addressed This Visit     Major depressive disorder, recurrent episode, moderate (HCC)     She is interested in decreasing her zoloft   She is doing well from this standpoint   rx sent and dosing instructions discussed              Relevant Medications    sertraline (ZOLOFT) 50 MG Tab    Other fatigue     Feeling fatigued for about 8 months   She has been iron def in the past and we will recheck this   Broad labs ordered to exclude/evaluate for organic cause   She had mono in the remote past       20+ min spent              Relevant Orders    IRON/TOTAL IRON BIND    MARITZA REFLEXIVE PROFILE    TSH    CBC WITH DIFFERENTIAL    Comp Metabolic Panel    VITAMIN D,25 HYDROXY    VITAMIN B12    FOLATE    CELIAC DISEASE AB PANEL    Encounter for surveillance of implantable subdermal contraceptive     Has nexplanon now!               Other Visit Diagnoses     Need for vaccination    -  Primary    Relevant Orders    INFLUENZA VACCINE QUAD INJ (PF) (Completed)                Instructed to follow up if symptoms worsen or fail to improve, ER/UC precautions discussed as well    Yajaira Gardner MD  [facility], Family Medicine   [address] [place] [address] [place]   [place] [id number]  Phone: [phone]

## 2022-01-05 ENCOUNTER — HOSPITAL ENCOUNTER (OUTPATIENT)
Dept: LAB | Facility: MEDICAL CENTER | Age: 22
End: 2022-01-05
Attending: FAMILY MEDICINE
Payer: COMMERCIAL

## 2022-01-05 DIAGNOSIS — R53.83 OTHER FATIGUE: ICD-10-CM

## 2022-01-05 LAB
25(OH)D3 SERPL-MCNC: 28 NG/ML (ref 30–100)
BASOPHILS # BLD AUTO: 0.7 % (ref 0–1.8)
BASOPHILS # BLD: 0.05 K/UL (ref 0–0.12)
EOSINOPHIL # BLD AUTO: 0.11 K/UL (ref 0–0.51)
EOSINOPHIL NFR BLD: 1.5 % (ref 0–6.9)
ERYTHROCYTE [DISTWIDTH] IN BLOOD BY AUTOMATED COUNT: 46 FL (ref 35.9–50)
FOLATE SERPL-MCNC: 12 NG/ML
HCT VFR BLD AUTO: 42.7 % (ref 37–47)
HGB BLD-MCNC: 13.6 G/DL (ref 12–16)
IMM GRANULOCYTES # BLD AUTO: 0.03 K/UL (ref 0–0.11)
IMM GRANULOCYTES NFR BLD AUTO: 0.4 % (ref 0–0.9)
IRON SATN MFR SERPL: 24 % (ref 15–55)
IRON SERPL-MCNC: 91 UG/DL (ref 40–170)
LYMPHOCYTES # BLD AUTO: 2.85 K/UL (ref 1–4.8)
LYMPHOCYTES NFR BLD: 37.8 % (ref 22–41)
MCH RBC QN AUTO: 27.6 PG (ref 27–33)
MCHC RBC AUTO-ENTMCNC: 31.9 G/DL (ref 33.6–35)
MCV RBC AUTO: 86.8 FL (ref 81.4–97.8)
MONOCYTES # BLD AUTO: 0.39 K/UL (ref 0–0.85)
MONOCYTES NFR BLD AUTO: 5.2 % (ref 0–13.4)
NEUTROPHILS # BLD AUTO: 4.11 K/UL (ref 2–7.15)
NEUTROPHILS NFR BLD: 54.4 % (ref 44–72)
NRBC # BLD AUTO: 0 K/UL
NRBC BLD-RTO: 0 /100 WBC
PLATELET # BLD AUTO: 256 K/UL (ref 164–446)
PMV BLD AUTO: 9.8 FL (ref 9–12.9)
RBC # BLD AUTO: 4.92 M/UL (ref 4.2–5.4)
TIBC SERPL-MCNC: 377 UG/DL (ref 250–450)
TSH SERPL DL<=0.005 MIU/L-ACNC: 0.32 UIU/ML (ref 0.38–5.33)
UIBC SERPL-MCNC: 286 UG/DL (ref 110–370)
VIT B12 SERPL-MCNC: 367 PG/ML (ref 211–911)
WBC # BLD AUTO: 7.5 K/UL (ref 4.8–10.8)

## 2022-01-05 PROCEDURE — 84443 ASSAY THYROID STIM HORMONE: CPT

## 2022-01-05 PROCEDURE — 83540 ASSAY OF IRON: CPT

## 2022-01-05 PROCEDURE — 36415 COLL VENOUS BLD VENIPUNCTURE: CPT

## 2022-01-05 PROCEDURE — 82746 ASSAY OF FOLIC ACID SERUM: CPT

## 2022-01-05 PROCEDURE — 82607 VITAMIN B-12: CPT

## 2022-01-05 PROCEDURE — 83550 IRON BINDING TEST: CPT

## 2022-01-05 PROCEDURE — 82306 VITAMIN D 25 HYDROXY: CPT

## 2022-01-05 PROCEDURE — 85025 COMPLETE CBC W/AUTO DIFF WBC: CPT

## 2022-01-05 PROCEDURE — 86038 ANTINUCLEAR ANTIBODIES: CPT

## 2022-01-06 ENCOUNTER — TELEPHONE (OUTPATIENT)
Dept: MEDICAL GROUP | Facility: MEDICAL CENTER | Age: 22
End: 2022-01-06

## 2022-01-06 DIAGNOSIS — R79.89 ABNORMAL TSH: ICD-10-CM

## 2022-01-06 NOTE — TELEPHONE ENCOUNTER
----- Message from Yajaira Gardner M.D. sent at 1/6/2022 11:55 AM PST -----  Thyroid is off and this is likely explanation or at least contributing to Ava not feeling well   We need more info - repeat labs and also do the thyroid US   I have reached out to our endocrinologist for recs as well  Dont schedule the NM test yet I want to see what he has to say first and what the repeat labs show   Schedule virtual follow up with me please   Thanks!

## 2022-01-06 NOTE — TELEPHONE ENCOUNTER
Spoke to pt and informed of results. She will repeat labs and schedule appt for US Thyroid and follow up with PCP afterwards. Advised not to do NM test yet

## 2022-01-08 ENCOUNTER — HOSPITAL ENCOUNTER (OUTPATIENT)
Dept: RADIOLOGY | Facility: MEDICAL CENTER | Age: 22
End: 2022-01-08
Attending: FAMILY MEDICINE
Payer: COMMERCIAL

## 2022-01-08 DIAGNOSIS — R79.89 ABNORMAL TSH: ICD-10-CM

## 2022-01-08 LAB — NUCLEAR IGG SER QL IA: NORMAL

## 2022-01-08 PROCEDURE — 76536 US EXAM OF HEAD AND NECK: CPT

## 2022-01-10 ENCOUNTER — HOSPITAL ENCOUNTER (OUTPATIENT)
Dept: LAB | Facility: MEDICAL CENTER | Age: 22
End: 2022-01-10
Attending: FAMILY MEDICINE
Payer: COMMERCIAL

## 2022-01-10 DIAGNOSIS — R53.83 OTHER FATIGUE: ICD-10-CM

## 2022-01-10 LAB
ALBUMIN SERPL BCP-MCNC: 4.7 G/DL (ref 3.2–4.9)
ALBUMIN/GLOB SERPL: 1.9 G/DL
ALP SERPL-CCNC: 68 U/L (ref 30–99)
ALT SERPL-CCNC: 11 U/L (ref 2–50)
ANION GAP SERPL CALC-SCNC: 10 MMOL/L (ref 7–16)
AST SERPL-CCNC: 12 U/L (ref 12–45)
BILIRUB SERPL-MCNC: 0.3 MG/DL (ref 0.1–1.5)
BUN SERPL-MCNC: 10 MG/DL (ref 8–22)
CALCIUM SERPL-MCNC: 9.6 MG/DL (ref 8.5–10.5)
CHLORIDE SERPL-SCNC: 106 MMOL/L (ref 96–112)
CO2 SERPL-SCNC: 23 MMOL/L (ref 20–33)
CREAT SERPL-MCNC: 0.67 MG/DL (ref 0.5–1.4)
FASTING STATUS PATIENT QL REPORTED: NORMAL
GLOBULIN SER CALC-MCNC: 2.5 G/DL (ref 1.9–3.5)
GLUCOSE SERPL-MCNC: 87 MG/DL (ref 65–99)
POTASSIUM SERPL-SCNC: 4.2 MMOL/L (ref 3.6–5.5)
PROT SERPL-MCNC: 7.2 G/DL (ref 6–8.2)
SODIUM SERPL-SCNC: 139 MMOL/L (ref 135–145)

## 2022-01-10 PROCEDURE — 36415 COLL VENOUS BLD VENIPUNCTURE: CPT

## 2022-01-10 PROCEDURE — 80053 COMPREHEN METABOLIC PANEL: CPT

## 2022-01-11 ENCOUNTER — HOSPITAL ENCOUNTER (OUTPATIENT)
Dept: LAB | Facility: MEDICAL CENTER | Age: 22
End: 2022-01-11
Attending: FAMILY MEDICINE
Payer: COMMERCIAL

## 2022-01-11 ENCOUNTER — TELEPHONE (OUTPATIENT)
Dept: MEDICAL GROUP | Facility: MEDICAL CENTER | Age: 22
End: 2022-01-11

## 2022-01-11 DIAGNOSIS — R79.89 ABNORMAL TSH: ICD-10-CM

## 2022-01-11 LAB
T4 FREE SERPL-MCNC: 1.02 NG/DL (ref 0.93–1.7)
TSH SERPL DL<=0.005 MIU/L-ACNC: 0.62 UIU/ML (ref 0.38–5.33)

## 2022-01-11 PROCEDURE — 84443 ASSAY THYROID STIM HORMONE: CPT

## 2022-01-11 PROCEDURE — 84439 ASSAY OF FREE THYROXINE: CPT

## 2022-01-11 PROCEDURE — 36415 COLL VENOUS BLD VENIPUNCTURE: CPT

## 2022-01-11 NOTE — TELEPHONE ENCOUNTER
1. Caller Name: Ava Pearce                          Call Back Number: [phone] (home) [phone] (work)        How would the patient prefer to be contacted with a response: Phone call OK to leave a detailed message    received call from pt asking about her recent labs and US. she states she will be going back to [place] for school at end of week and would like to know if she needs to do anything else before she leaves pending test results.    Please advise

## 2022-01-12 ENCOUNTER — TELEPHONE (OUTPATIENT)
Dept: MEDICAL GROUP | Facility: MEDICAL CENTER | Age: 22
End: 2022-01-12

## 2022-01-12 DIAGNOSIS — R79.89 ABNORMAL THYROID STIMULATING HORMONE LEVEL: ICD-10-CM

## 2022-01-12 NOTE — TELEPHONE ENCOUNTER
Spoke to pt and informed of US results. Pt will go back into lab to repeat the TSH and free thyroxine

## 2023-03-10 NOTE — TELEPHONE ENCOUNTER
----- Message from Yajaira Gardner M.D. sent at 8/22/2019  4:54 PM PDT -----  Can you call aliza and let her know we found a reason for her fatigue which is her low iron   She can take supplement or she can simply increase iron in her diet either way   But she can recheck in 2-3 mo       I will call in supplement in case she wants, will constipate her so I recommend combo with stool softener such as colace       Curvilinear Excision Additional Text (Leave Blank If You Do Not Want): The margin was drawn around the clinically apparent lesion.  A curvilinear shape was then drawn on the skin incorporating the lesion and margins.  Incisions were then made along these lines to the appropriate tissue plane and the lesion was extirpated.

## 2024-10-19 NOTE — PROGRESS NOTES
"Subjective:      Ava Pearce is a 20 y.o. female who presents with Abdominal Pain (x 1 month, loss of appitite, bloating, low back pain.)    Past Medical History:   Diagnosis Date   • Allergy    • Anxiety    • ASTHMA    • Depression    • Heart burn      Social History     Socioeconomic History   • Marital status: Single     Spouse name: Not on file   • Number of children: Not on file   • Years of education: Not on file   • Highest education level: Not on file   Occupational History   • Not on file   Tobacco Use   • Smoking status: Never Smoker   • Smokeless tobacco: Never Used   • Tobacco comment: Rarely \"vape\"   Vaping Use   • Vaping Use: Former   • Substances: Nicotine   Substance and Sexual Activity   • Alcohol use: Yes     Comment: weekly   • Drug use: Yes     Types: Marijuana     Comment: daily   • Sexual activity: Yes     Birth control/protection: Pill   Other Topics Concern   • Behavioral problems Not Asked   • Interpersonal relationships Not Asked   • Sad or not enjoying activities Not Asked   • Suicidal thoughts Not Asked   • Poor school performance Not Asked   • Reading difficulties Not Asked   • Speech difficulties Not Asked   • Writing difficulties Not Asked   • Inadequate sleep No   • Excessive TV viewing Not Asked   • Excessive video game use Not Asked   • Inadequate exercise Not Asked   • Sports related Not Asked   • Poor diet Not Asked   • Family concerns for drug/alcohol abuse Not Asked   • Poor oral hygiene Not Asked   • Bike safety Not Asked   • Family concerns vehicle safety Not Asked   Social History Narrative   • Not on file     Social Determinants of Health     Financial Resource Strain:    • Difficulty of Paying Living Expenses:    Food Insecurity:    • Worried About Running Out of Food in the Last Year:    • Ran Out of Food in the Last Year:    Transportation Needs:    • Lack of Transportation (Medical):    • Lack of Transportation (Non-Medical):    Physical Activity:    • Days of " See plan as noted above   Exercise per Week:    • Minutes of Exercise per Session:    Stress:    • Feeling of Stress :    Social Connections:    • Frequency of Communication with Friends and Family:    • Frequency of Social Gatherings with Friends and Family:    • Attends [employer] Services:    • Active Member of Clubs or Organizations:    • Attends Club or Organization Meetings:    • Marital Status:    Intimate Partner Violence:    • Fear of Current or Ex-Partner:    • Emotionally Abused:    • Physically Abused:    • Sexually Abused:      Family History   Problem Relation Age of Onset   • Depression Mother    • Depression Brother    • Drug abuse Brother    • Bipolar disorder Paternal Aunt    • Alcohol abuse Maternal Grandfather    • Depression Maternal Grandmother    • Depression Cousin        Allergie: Pcn [penicillins] and Wellbutrin [bupropion]    Patient is a 20-year-old female who presents today with complaint of lower abdominal pain for 1 month.  Patient states she has been to her gynecologist and had a pelvic exam done.  She states that so far she has not had any imaging and her symptoms have been persistent.  No fever, no vomiting or diarrhea.  Patient states she stopped taking her birth control pills on May 7 as she believed that that may have been a contributing factor to her present symptoms.  She states she has not had a history of pelvic pain previously.  No known history of ovarian cysts.  No vaginal discharge.          Other  This is a new problem. The current episode started in the past 7 days. The problem occurs constantly. The problem has been unchanged. Associated symptoms include abdominal pain. Pertinent negatives include no chills or fever. She has tried nothing for the symptoms. The treatment provided no relief.       Review of Systems   Constitutional: Positive for malaise/fatigue. Negative for chills and fever.   Gastrointestinal: Positive for abdominal pain.   All other systems reviewed and are negative.          "Objective:     /70   Pulse 64   Temp 36.3 °C (97.4 °F)   Resp 15   Ht 1.626 m (5' 4\")   Wt 61.2 kg (135 lb)   SpO2 97%   BMI 23.17 kg/m²      Physical Exam  Vitals reviewed.   Constitutional:       Appearance: Normal appearance. She is normal weight.   Eyes:      Extraocular Movements: Extraocular movements intact.      Conjunctiva/sclera: Conjunctivae normal.      Pupils: Pupils are equal, round, and reactive to light.   Cardiovascular:      Rate and Rhythm: Normal rate and regular rhythm.      Heart sounds: Normal heart sounds.   Pulmonary:      Effort: Pulmonary effort is normal.      Breath sounds: Normal breath sounds.   Musculoskeletal:         General: Normal range of motion.      Cervical back: Normal range of motion and neck supple.   Skin:     General: Skin is warm.   Neurological:      General: No focal deficit present.      Mental Status: She is alert and oriented to person, place, and time.   Psychiatric:         Mood and Affect: Mood normal.         Behavior: Behavior normal.         Thought Content: Thought content normal.         Judgment: Judgment normal.       UA: Trace leukocytes    Urine hCG: Negative                 Assessment/Plan:        1. Lower abdominal pain    Labs ordered; gonorrhea/chlamydia, vaginal pathogens  pelvic ultrasound ordered  we will call results  advised patient that if because of her symptoms not readily apparent based on testing that I would refer her back to her gynecologist.      "

## (undated) DEVICE — SUTURE 3-0 ETHILON FS-1 - (36/BX) 30 INCH

## (undated) DEVICE — CANISTER SUCTION RIGID RED 1500CC (40EA/CA)

## (undated) DEVICE — ELECTRODE 850 FOAM ADHESIVE - HYDROGEL RADIOTRNSPRNT (50/PK)

## (undated) DEVICE — SUTURE GENERAL

## (undated) DEVICE — NEPTUNE 4 PORT MANIFOLD - (20/PK)

## (undated) DEVICE — SET LEADWIRE 5 LEAD BEDSIDE DISPOSABLE ECG (1SET OF 5/EA)

## (undated) DEVICE — WATER IRRIGATION STERILE 1000ML (12EA/CA)

## (undated) DEVICE — SYRINGE DISP. 12 CC LL - (100/BX)

## (undated) DEVICE — ELECTRODE DUAL RETURN W/ CORD - (50/PK)

## (undated) DEVICE — ANTI-FOG SOLUTION - 60BTL/CA

## (undated) DEVICE — SUCTION INSTRUMENT YANKAUER BULBOUS TIP W/O VENT (50EA/CA)

## (undated) DEVICE — PATTIES SURG X-RAYCOTTONOID - 1/2 X 3 IN (200/CA)

## (undated) DEVICE — KIT  I.V. START (100EA/CA)

## (undated) DEVICE — NEEDLE SPINAL NON-SAFETY 25GA X 3 (25EA/BX)"

## (undated) DEVICE — SENSOR SPO2 NEO LNCS ADHESIVE (20/BX) SEE USER NOTES

## (undated) DEVICE — MASK ANESTHESIA ADULT  - (100/CA)

## (undated) DEVICE — GLOVE BIOGEL PI INDICATOR SZ 6.5 SURGICAL PF LF - (50/BX 4BX/CA)

## (undated) DEVICE — TUBE SHILEY ENDOTRACHEAL NASAL RAE CUFFED 6.5MM WITH TAPERGUARD (10EA/BX)

## (undated) DEVICE — GLOVE BIOGEL SZ 7 SURGICAL PF LTX - (50PR/BX 4BX/CA)

## (undated) DEVICE — HEAD HOLDER JUNIOR/ADULT

## (undated) DEVICE — SODIUM CHL IRRIGATION 0.9% 1000ML (12EA/CA)

## (undated) DEVICE — PROTECTOR ULNA NERVE - (36PR/CA)

## (undated) DEVICE — Device

## (undated) DEVICE — PACK ENT OR - (2EA/CA)

## (undated) DEVICE — LEAD SET 6 DISP. EKG NIHON KOHDEN

## (undated) DEVICE — TUBE CONNECTING SUCTION - CLEAR PLASTIC STERILE 72 IN (50EA/CA)

## (undated) DEVICE — BLADE STRAIGHT SINUS (5EA/PK)

## (undated) DEVICE — TUBING CLEARLINK DUO-VENT - C-FLO (48EA/CA)

## (undated) DEVICE — SYRINGE 10 ML CONTROL LL (25EA/BX 4BX/CA)

## (undated) DEVICE — CANISTER SUCTION 3000ML MECHANICAL FILTER AUTO SHUTOFF MEDI-VAC NONSTERILE LF DISP  (40EA/CA)

## (undated) DEVICE — GOWN WARMING STANDARD FLEX - (30/CA)

## (undated) DEVICE — KIT ANESTHESIA W/CIRCUIT & 3/LT BAG W/FILTER (20EA/CA)

## (undated) DEVICE — MEDICINE CUP STERILE 2 OZ - (100/CA)

## (undated) DEVICE — BOVIE FOOT CONTROL SUCTION - 6IN 10FR (25EA/CA)

## (undated) DEVICE — CATHETER IV 20 GA X 1-1/4 ---SURG.& SDS ONLY--- (50EA/BX)

## (undated) DEVICE — DRESSING NASOPORE 8CM STD - (8EA/PK)

## (undated) DEVICE — LACTATED RINGERS INJ 1000 ML - (14EA/CA 60CA/PF)